# Patient Record
Sex: MALE | Race: WHITE | NOT HISPANIC OR LATINO | Employment: OTHER | URBAN - NONMETROPOLITAN AREA
[De-identification: names, ages, dates, MRNs, and addresses within clinical notes are randomized per-mention and may not be internally consistent; named-entity substitution may affect disease eponyms.]

---

## 2021-06-01 ENCOUNTER — OFFICE VISIT (OUTPATIENT)
Dept: FAMILY MEDICINE | Facility: CLINIC | Age: 86
End: 2021-06-01
Payer: MEDICARE

## 2021-06-01 VITALS
DIASTOLIC BLOOD PRESSURE: 79 MMHG | HEART RATE: 72 BPM | OXYGEN SATURATION: 98 % | RESPIRATION RATE: 18 BRPM | SYSTOLIC BLOOD PRESSURE: 165 MMHG | BODY MASS INDEX: 21.16 KG/M2 | WEIGHT: 127 LBS | HEIGHT: 65 IN | TEMPERATURE: 98 F

## 2021-06-01 DIAGNOSIS — I10 ESSENTIAL HYPERTENSION: Primary | ICD-10-CM

## 2021-06-01 DIAGNOSIS — J44.9 CHRONIC OBSTRUCTIVE PULMONARY DISEASE, UNSPECIFIED COPD TYPE: ICD-10-CM

## 2021-06-01 DIAGNOSIS — E03.9 HYPOTHYROIDISM, UNSPECIFIED TYPE: ICD-10-CM

## 2021-06-01 DIAGNOSIS — Z86.73 H/O: CVA (CEREBROVASCULAR ACCIDENT): ICD-10-CM

## 2021-06-01 DIAGNOSIS — E78.5 DYSLIPIDEMIA: ICD-10-CM

## 2021-06-01 LAB
ANION GAP SERPL CALCULATED.3IONS-SCNC: 13 MMOL/L (ref 7–16)
BASOPHILS # BLD AUTO: 0.07 K/UL (ref 0–0.2)
BASOPHILS NFR BLD AUTO: 1.1 % (ref 0–1)
BUN SERPL-MCNC: 17 MG/DL (ref 7–18)
BUN/CREAT SERPL: 17 (ref 6–20)
CALCIUM SERPL-MCNC: 9.4 MG/DL (ref 8.5–10.1)
CHLORIDE SERPL-SCNC: 105 MMOL/L (ref 98–107)
CO2 SERPL-SCNC: 27 MMOL/L (ref 21–32)
CREAT SERPL-MCNC: 0.98 MG/DL (ref 0.7–1.3)
DIFFERENTIAL METHOD BLD: ABNORMAL
EOSINOPHIL # BLD AUTO: 0.26 K/UL (ref 0–0.5)
EOSINOPHIL NFR BLD AUTO: 4 % (ref 1–4)
ERYTHROCYTE [DISTWIDTH] IN BLOOD BY AUTOMATED COUNT: 13.9 % (ref 11.5–14.5)
GLUCOSE SERPL-MCNC: 93 MG/DL (ref 74–106)
HCT VFR BLD AUTO: 41.4 % (ref 40–54)
HGB BLD-MCNC: 13.2 G/DL (ref 13.5–18)
IMM GRANULOCYTES # BLD AUTO: 0.01 K/UL (ref 0–0.04)
IMM GRANULOCYTES NFR BLD: 0.2 % (ref 0–0.4)
LYMPHOCYTES # BLD AUTO: 2.89 K/UL (ref 1–4.8)
LYMPHOCYTES NFR BLD AUTO: 45 % (ref 27–41)
MCH RBC QN AUTO: 27.3 PG (ref 27–31)
MCHC RBC AUTO-ENTMCNC: 31.9 G/DL (ref 32–36)
MCV RBC AUTO: 85.5 FL (ref 80–96)
MONOCYTES # BLD AUTO: 0.8 K/UL (ref 0–0.8)
MONOCYTES NFR BLD AUTO: 12.5 % (ref 2–6)
MPC BLD CALC-MCNC: 10 FL (ref 9.4–12.4)
NEUTROPHILS # BLD AUTO: 2.39 K/UL (ref 1.8–7.7)
NEUTROPHILS NFR BLD AUTO: 37.2 % (ref 53–65)
NRBC # BLD AUTO: 0 X10E3/UL
NRBC, AUTO (.00): 0 %
PLATELET # BLD AUTO: 304 K/UL (ref 150–400)
POTASSIUM SERPL-SCNC: 4.7 MMOL/L (ref 3.5–5.1)
RBC # BLD AUTO: 4.84 M/UL (ref 4.6–6.2)
SODIUM SERPL-SCNC: 140 MMOL/L (ref 136–145)
TSH SERPL DL<=0.005 MIU/L-ACNC: 0.42 UIU/ML (ref 0.36–3.74)
WBC # BLD AUTO: 6.42 K/UL (ref 4.5–11)

## 2021-06-01 PROCEDURE — 99213 PR OFFICE/OUTPT VISIT, EST, LEVL III, 20-29 MIN: ICD-10-PCS | Mod: ,,, | Performed by: NURSE PRACTITIONER

## 2021-06-01 PROCEDURE — 84443 ASSAY THYROID STIM HORMONE: CPT | Mod: ,,, | Performed by: CLINICAL MEDICAL LABORATORY

## 2021-06-01 PROCEDURE — 85025 CBC WITH DIFFERENTIAL: ICD-10-PCS | Mod: ,,, | Performed by: CLINICAL MEDICAL LABORATORY

## 2021-06-01 PROCEDURE — 99213 OFFICE O/P EST LOW 20 MIN: CPT | Mod: ,,, | Performed by: NURSE PRACTITIONER

## 2021-06-01 PROCEDURE — 84443 TSH: ICD-10-PCS | Mod: ,,, | Performed by: CLINICAL MEDICAL LABORATORY

## 2021-06-01 PROCEDURE — 80048 BASIC METABOLIC PNL TOTAL CA: CPT | Mod: ,,, | Performed by: CLINICAL MEDICAL LABORATORY

## 2021-06-01 PROCEDURE — 85025 COMPLETE CBC W/AUTO DIFF WBC: CPT | Mod: ,,, | Performed by: CLINICAL MEDICAL LABORATORY

## 2021-06-01 PROCEDURE — 80048 BASIC METABOLIC PANEL: ICD-10-PCS | Mod: ,,, | Performed by: CLINICAL MEDICAL LABORATORY

## 2021-06-01 RX ORDER — CLOPIDOGREL BISULFATE 75 MG/1
75 TABLET ORAL DAILY
COMMUNITY
End: 2021-06-01 | Stop reason: SDUPTHER

## 2021-06-01 RX ORDER — GABAPENTIN 100 MG/1
100 CAPSULE ORAL NIGHTLY
COMMUNITY
End: 2021-06-01 | Stop reason: SDUPTHER

## 2021-06-01 RX ORDER — ATORVASTATIN CALCIUM 20 MG/1
20 TABLET, FILM COATED ORAL DAILY
Qty: 90 TABLET | Refills: 1 | Status: SHIPPED | OUTPATIENT
Start: 2021-06-01 | End: 2021-06-08

## 2021-06-01 RX ORDER — LISINOPRIL 10 MG/1
10 TABLET ORAL DAILY
Qty: 30 TABLET | Refills: 2 | Status: SHIPPED | OUTPATIENT
Start: 2021-06-01 | End: 2021-08-19

## 2021-06-01 RX ORDER — CLOPIDOGREL BISULFATE 75 MG/1
75 TABLET ORAL DAILY
Qty: 90 TABLET | Refills: 1 | Status: SHIPPED | OUTPATIENT
Start: 2021-06-01 | End: 2021-11-29

## 2021-06-01 RX ORDER — LEVOTHYROXINE SODIUM 88 UG/1
88 TABLET ORAL
COMMUNITY
End: 2021-06-01 | Stop reason: SDUPTHER

## 2021-06-01 RX ORDER — ASPIRIN 81 MG/1
81 TABLET ORAL DAILY
COMMUNITY
End: 2023-04-13

## 2021-06-01 RX ORDER — ROPINIROLE 0.25 MG/1
0.25 TABLET, FILM COATED ORAL NIGHTLY
Qty: 90 TABLET | Refills: 1 | Status: SHIPPED | OUTPATIENT
Start: 2021-06-01 | End: 2021-11-23 | Stop reason: SDUPTHER

## 2021-06-01 RX ORDER — ROPINIROLE 0.25 MG/1
0.25 TABLET, FILM COATED ORAL NIGHTLY
COMMUNITY
End: 2021-06-01 | Stop reason: SDUPTHER

## 2021-06-01 RX ORDER — ATORVASTATIN CALCIUM 20 MG/1
20 TABLET, FILM COATED ORAL DAILY
COMMUNITY
End: 2021-06-01 | Stop reason: SDUPTHER

## 2021-06-01 RX ORDER — LEVOTHYROXINE SODIUM 88 UG/1
88 TABLET ORAL
Qty: 90 TABLET | Refills: 1 | Status: SHIPPED | OUTPATIENT
Start: 2021-06-01 | End: 2021-12-20

## 2021-06-01 RX ORDER — AMLODIPINE BESYLATE 5 MG/1
5 TABLET ORAL DAILY
COMMUNITY
End: 2021-06-01 | Stop reason: SDUPTHER

## 2021-06-01 RX ORDER — GABAPENTIN 100 MG/1
100 CAPSULE ORAL NIGHTLY
Qty: 90 CAPSULE | Refills: 1 | Status: SHIPPED | OUTPATIENT
Start: 2021-06-01 | End: 2022-11-10 | Stop reason: SDUPTHER

## 2021-06-01 RX ORDER — AMLODIPINE BESYLATE 5 MG/1
5 TABLET ORAL DAILY
Qty: 90 TABLET | Refills: 1 | Status: SHIPPED | OUTPATIENT
Start: 2021-06-01 | End: 2021-06-08

## 2021-06-02 PROBLEM — E78.5 DYSLIPIDEMIA: Status: ACTIVE | Noted: 2021-06-02

## 2021-06-02 PROBLEM — E03.9 HYPOTHYROIDISM: Status: ACTIVE | Noted: 2021-06-02

## 2021-06-02 PROBLEM — Z86.73 H/O: CVA (CEREBROVASCULAR ACCIDENT): Status: ACTIVE | Noted: 2021-06-02

## 2021-06-02 PROBLEM — I10 ESSENTIAL HYPERTENSION: Status: ACTIVE | Noted: 2021-06-02

## 2021-06-02 PROBLEM — J44.9 COPD (CHRONIC OBSTRUCTIVE PULMONARY DISEASE): Status: ACTIVE | Noted: 2021-06-02

## 2021-06-03 ENCOUNTER — TELEPHONE (OUTPATIENT)
Dept: FAMILY MEDICINE | Facility: CLINIC | Age: 86
End: 2021-06-03

## 2021-06-08 ENCOUNTER — OFFICE VISIT (OUTPATIENT)
Dept: FAMILY MEDICINE | Facility: CLINIC | Age: 86
End: 2021-06-08
Payer: MEDICARE

## 2021-06-08 VITALS
HEIGHT: 65 IN | DIASTOLIC BLOOD PRESSURE: 80 MMHG | TEMPERATURE: 98 F | BODY MASS INDEX: 21.16 KG/M2 | OXYGEN SATURATION: 98 % | SYSTOLIC BLOOD PRESSURE: 124 MMHG | RESPIRATION RATE: 18 BRPM | WEIGHT: 127 LBS | HEART RATE: 81 BPM

## 2021-06-08 DIAGNOSIS — I10 ESSENTIAL HYPERTENSION: ICD-10-CM

## 2021-06-08 DIAGNOSIS — Z12.5 ENCOUNTER FOR SCREENING FOR MALIGNANT NEOPLASM OF PROSTATE: ICD-10-CM

## 2021-06-08 DIAGNOSIS — E78.5 DYSLIPIDEMIA: Primary | ICD-10-CM

## 2021-06-08 DIAGNOSIS — Z85.46 H/O PROSTATE CANCER: ICD-10-CM

## 2021-06-08 LAB
CHOLEST SERPL-MCNC: 120 MG/DL (ref 0–200)
CHOLEST/HDLC SERPL: 2.1 {RATIO}
HDLC SERPL-MCNC: 56 MG/DL (ref 40–60)
LDLC SERPL CALC-MCNC: 42 MG/DL
LDLC/HDLC SERPL: 0.8 {RATIO}
NONHDLC SERPL-MCNC: 64 MG/DL
PSA SERPL-MCNC: 0.61 NG/ML (ref 0–4.4)
TRIGL SERPL-MCNC: 109 MG/DL (ref 35–150)
VLDLC SERPL-MCNC: 22 MG/DL

## 2021-06-08 PROCEDURE — 80061 LIPID PANEL: ICD-10-PCS | Mod: ,,, | Performed by: CLINICAL MEDICAL LABORATORY

## 2021-06-08 PROCEDURE — 80061 LIPID PANEL: CPT | Mod: ,,, | Performed by: CLINICAL MEDICAL LABORATORY

## 2021-06-08 PROCEDURE — 99213 PR OFFICE/OUTPT VISIT, EST, LEVL III, 20-29 MIN: ICD-10-PCS | Mod: ,,, | Performed by: NURSE PRACTITIONER

## 2021-06-08 PROCEDURE — G0103 PSA, SCREENING: ICD-10-PCS | Mod: ,,, | Performed by: CLINICAL MEDICAL LABORATORY

## 2021-06-08 PROCEDURE — 99213 OFFICE O/P EST LOW 20 MIN: CPT | Mod: ,,, | Performed by: NURSE PRACTITIONER

## 2021-06-08 PROCEDURE — G0103 PSA SCREENING: HCPCS | Mod: ,,, | Performed by: CLINICAL MEDICAL LABORATORY

## 2021-06-08 RX ORDER — ATORVASTATIN CALCIUM 20 MG/1
TABLET, FILM COATED ORAL
Qty: 90 TABLET | Refills: 1
Start: 2021-06-08 | End: 2021-12-13

## 2021-06-09 ENCOUNTER — TELEPHONE (OUTPATIENT)
Dept: FAMILY MEDICINE | Facility: CLINIC | Age: 86
End: 2021-06-09

## 2021-08-31 RX ORDER — LISINOPRIL 10 MG/1
10 TABLET ORAL DAILY
Qty: 30 TABLET | Refills: 2 | Status: SHIPPED | OUTPATIENT
Start: 2021-08-31 | End: 2021-11-23 | Stop reason: SDUPTHER

## 2021-11-23 RX ORDER — LISINOPRIL 10 MG/1
10 TABLET ORAL DAILY
Qty: 30 TABLET | Refills: 0 | Status: SHIPPED | OUTPATIENT
Start: 2021-11-23 | End: 2021-12-23 | Stop reason: SDUPTHER

## 2021-11-23 RX ORDER — ROPINIROLE 0.25 MG/1
0.25 TABLET, FILM COATED ORAL NIGHTLY
Qty: 30 TABLET | Refills: 0 | Status: SHIPPED | OUTPATIENT
Start: 2021-11-23 | End: 2021-12-22 | Stop reason: SDUPTHER

## 2022-03-11 DIAGNOSIS — Z71.89 COMPLEX CARE COORDINATION: ICD-10-CM

## 2022-11-10 ENCOUNTER — OFFICE VISIT (OUTPATIENT)
Dept: FAMILY MEDICINE | Facility: CLINIC | Age: 87
End: 2022-11-10
Payer: MEDICARE

## 2022-11-10 VITALS
WEIGHT: 118 LBS | RESPIRATION RATE: 18 BRPM | BODY MASS INDEX: 19.66 KG/M2 | DIASTOLIC BLOOD PRESSURE: 106 MMHG | TEMPERATURE: 98 F | OXYGEN SATURATION: 99 % | SYSTOLIC BLOOD PRESSURE: 174 MMHG | HEIGHT: 65 IN | HEART RATE: 72 BPM

## 2022-11-10 DIAGNOSIS — R41.3 MEMORY LOSS: ICD-10-CM

## 2022-11-10 DIAGNOSIS — I10 ESSENTIAL HYPERTENSION: Primary | ICD-10-CM

## 2022-11-10 DIAGNOSIS — Z91.81 AT HIGH RISK FOR FALLS: ICD-10-CM

## 2022-11-10 DIAGNOSIS — E78.5 DYSLIPIDEMIA: ICD-10-CM

## 2022-11-10 DIAGNOSIS — Z12.5 SCREENING FOR PROSTATE CANCER: ICD-10-CM

## 2022-11-10 DIAGNOSIS — R26.81 UNSTEADY GAIT: ICD-10-CM

## 2022-11-10 DIAGNOSIS — E03.9 HYPOTHYROIDISM, UNSPECIFIED TYPE: ICD-10-CM

## 2022-11-10 LAB
ALBUMIN SERPL BCP-MCNC: 4.1 G/DL (ref 3.5–5)
ALBUMIN/GLOB SERPL: 1.1 {RATIO}
ALP SERPL-CCNC: 113 U/L (ref 45–115)
ALT SERPL W P-5'-P-CCNC: 29 U/L (ref 16–61)
ANION GAP SERPL CALCULATED.3IONS-SCNC: 13 MMOL/L (ref 7–16)
AST SERPL W P-5'-P-CCNC: 40 U/L (ref 15–37)
BASOPHILS # BLD AUTO: 0.07 K/UL (ref 0–0.2)
BASOPHILS NFR BLD AUTO: 1 % (ref 0–1)
BILIRUB SERPL-MCNC: 1 MG/DL (ref ?–1.2)
BUN SERPL-MCNC: 17 MG/DL (ref 7–18)
BUN/CREAT SERPL: 16 (ref 6–20)
CALCIUM SERPL-MCNC: 9.4 MG/DL (ref 8.5–10.1)
CHLORIDE SERPL-SCNC: 106 MMOL/L (ref 98–107)
CHOLEST SERPL-MCNC: 112 MG/DL (ref 0–200)
CHOLEST/HDLC SERPL: 2.1 {RATIO}
CO2 SERPL-SCNC: 24 MMOL/L (ref 21–32)
CREAT SERPL-MCNC: 1.04 MG/DL (ref 0.7–1.3)
DIFFERENTIAL METHOD BLD: ABNORMAL
EGFR (NO RACE VARIABLE) (RUSH/TITUS): 69 ML/MIN/1.73M²
EOSINOPHIL # BLD AUTO: 0.07 K/UL (ref 0–0.5)
EOSINOPHIL NFR BLD AUTO: 1 % (ref 1–4)
ERYTHROCYTE [DISTWIDTH] IN BLOOD BY AUTOMATED COUNT: 14.6 % (ref 11.5–14.5)
GLOBULIN SER-MCNC: 3.6 G/DL (ref 2–4)
GLUCOSE SERPL-MCNC: 89 MG/DL (ref 74–106)
HCT VFR BLD AUTO: 41.2 % (ref 40–54)
HDLC SERPL-MCNC: 54 MG/DL (ref 40–60)
HGB BLD-MCNC: 12.7 G/DL (ref 13.5–18)
IMM GRANULOCYTES # BLD AUTO: 0.02 K/UL (ref 0–0.04)
IMM GRANULOCYTES NFR BLD: 0.3 % (ref 0–0.4)
LDLC SERPL CALC-MCNC: 44 MG/DL
LYMPHOCYTES # BLD AUTO: 2.87 K/UL (ref 1–4.8)
LYMPHOCYTES NFR BLD AUTO: 41.3 % (ref 27–41)
MCH RBC QN AUTO: 27.7 PG (ref 27–31)
MCHC RBC AUTO-ENTMCNC: 30.8 G/DL (ref 32–36)
MCV RBC AUTO: 90 FL (ref 80–96)
MONOCYTES # BLD AUTO: 0.63 K/UL (ref 0–0.8)
MONOCYTES NFR BLD AUTO: 9.1 % (ref 2–6)
MPC BLD CALC-MCNC: 11 FL (ref 9.4–12.4)
NEUTROPHILS # BLD AUTO: 3.29 K/UL (ref 1.8–7.7)
NEUTROPHILS NFR BLD AUTO: 47.3 % (ref 53–65)
NONHDLC SERPL-MCNC: 58 MG/DL
NRBC # BLD AUTO: 0 X10E3/UL
NRBC, AUTO (.00): 0 %
PLATELET # BLD AUTO: 259 K/UL (ref 150–400)
POTASSIUM SERPL-SCNC: 3.8 MMOL/L (ref 3.5–5.1)
PROT SERPL-MCNC: 7.7 G/DL (ref 6.4–8.2)
PSA SERPL-MCNC: 1.62 NG/ML
RBC # BLD AUTO: 4.58 M/UL (ref 4.6–6.2)
SODIUM SERPL-SCNC: 139 MMOL/L (ref 136–145)
TRIGL SERPL-MCNC: 71 MG/DL (ref 35–150)
TSH SERPL DL<=0.005 MIU/L-ACNC: 0.05 UIU/ML (ref 0.36–3.74)
VLDLC SERPL-MCNC: 14 MG/DL
WBC # BLD AUTO: 6.95 K/UL (ref 4.5–11)

## 2022-11-10 PROCEDURE — 1126F PR PAIN SEVERITY QUANTIFIED, NO PAIN PRESENT: ICD-10-PCS | Mod: ,,, | Performed by: NURSE PRACTITIONER

## 2022-11-10 PROCEDURE — 1160F RVW MEDS BY RX/DR IN RCRD: CPT | Mod: ,,, | Performed by: NURSE PRACTITIONER

## 2022-11-10 PROCEDURE — 1159F PR MEDICATION LIST DOCUMENTED IN MEDICAL RECORD: ICD-10-PCS | Mod: ,,, | Performed by: NURSE PRACTITIONER

## 2022-11-10 PROCEDURE — 80061 LIPID PANEL: CPT | Mod: ,,, | Performed by: CLINICAL MEDICAL LABORATORY

## 2022-11-10 PROCEDURE — G0103 PSA SCREENING: HCPCS | Mod: ,,, | Performed by: CLINICAL MEDICAL LABORATORY

## 2022-11-10 PROCEDURE — 99214 OFFICE O/P EST MOD 30 MIN: CPT | Mod: ,,, | Performed by: NURSE PRACTITIONER

## 2022-11-10 PROCEDURE — 99214 PR OFFICE/OUTPT VISIT, EST, LEVL IV, 30-39 MIN: ICD-10-PCS | Mod: ,,, | Performed by: NURSE PRACTITIONER

## 2022-11-10 PROCEDURE — 1159F MED LIST DOCD IN RCRD: CPT | Mod: ,,, | Performed by: NURSE PRACTITIONER

## 2022-11-10 PROCEDURE — 1160F PR REVIEW ALL MEDS BY PRESCRIBER/CLIN PHARMACIST DOCUMENTED: ICD-10-PCS | Mod: ,,, | Performed by: NURSE PRACTITIONER

## 2022-11-10 PROCEDURE — 80050 PR  GENERAL HEALTH PANEL: ICD-10-PCS | Mod: ,,, | Performed by: CLINICAL MEDICAL LABORATORY

## 2022-11-10 PROCEDURE — 3288F FALL RISK ASSESSMENT DOCD: CPT | Mod: ,,, | Performed by: NURSE PRACTITIONER

## 2022-11-10 PROCEDURE — G0103 PSA, SCREENING: ICD-10-PCS | Mod: ,,, | Performed by: CLINICAL MEDICAL LABORATORY

## 2022-11-10 PROCEDURE — 1101F PT FALLS ASSESS-DOCD LE1/YR: CPT | Mod: ,,, | Performed by: NURSE PRACTITIONER

## 2022-11-10 PROCEDURE — 3288F PR FALLS RISK ASSESSMENT DOCUMENTED: ICD-10-PCS | Mod: ,,, | Performed by: NURSE PRACTITIONER

## 2022-11-10 PROCEDURE — 1126F AMNT PAIN NOTED NONE PRSNT: CPT | Mod: ,,, | Performed by: NURSE PRACTITIONER

## 2022-11-10 PROCEDURE — 1101F PR PT FALLS ASSESS DOC 0-1 FALLS W/OUT INJ PAST YR: ICD-10-PCS | Mod: ,,, | Performed by: NURSE PRACTITIONER

## 2022-11-10 PROCEDURE — 80061 LIPID PANEL: ICD-10-PCS | Mod: ,,, | Performed by: CLINICAL MEDICAL LABORATORY

## 2022-11-10 PROCEDURE — 80050 GENERAL HEALTH PANEL: CPT | Mod: ,,, | Performed by: CLINICAL MEDICAL LABORATORY

## 2022-11-10 RX ORDER — LEVOTHYROXINE SODIUM 88 UG/1
88 TABLET ORAL
Qty: 90 TABLET | Refills: 1 | Status: SHIPPED | OUTPATIENT
Start: 2022-11-10 | End: 2022-11-10 | Stop reason: SDUPTHER

## 2022-11-10 RX ORDER — GABAPENTIN 100 MG/1
100 CAPSULE ORAL NIGHTLY
Qty: 90 CAPSULE | Refills: 1 | Status: SHIPPED | OUTPATIENT
Start: 2022-11-10 | End: 2022-11-11

## 2022-11-10 RX ORDER — ROPINIROLE 0.25 MG/1
0.25 TABLET, FILM COATED ORAL NIGHTLY
Qty: 90 TABLET | Refills: 1 | Status: SHIPPED | OUTPATIENT
Start: 2022-11-10 | End: 2022-11-11

## 2022-11-10 RX ORDER — ROPINIROLE 0.25 MG/1
0.25 TABLET, FILM COATED ORAL NIGHTLY
Qty: 90 TABLET | Refills: 1 | Status: SHIPPED | OUTPATIENT
Start: 2022-11-10 | End: 2022-11-10 | Stop reason: SDUPTHER

## 2022-11-10 RX ORDER — LISINOPRIL 20 MG/1
20 TABLET ORAL DAILY
Qty: 90 TABLET | Refills: 1 | Status: SHIPPED | OUTPATIENT
Start: 2022-11-10 | End: 2022-11-11 | Stop reason: SDUPTHER

## 2022-11-10 RX ORDER — LEVOTHYROXINE SODIUM 88 UG/1
88 TABLET ORAL
Qty: 90 TABLET | Refills: 1 | Status: SHIPPED | OUTPATIENT
Start: 2022-11-10 | End: 2022-11-11

## 2022-11-10 RX ORDER — CLOPIDOGREL BISULFATE 75 MG/1
75 TABLET ORAL DAILY
Qty: 90 TABLET | Refills: 1 | Status: SHIPPED | OUTPATIENT
Start: 2022-11-10 | End: 2022-11-11 | Stop reason: SDUPTHER

## 2022-11-10 RX ORDER — CLOPIDOGREL BISULFATE 75 MG/1
75 TABLET ORAL DAILY
Qty: 90 TABLET | Refills: 1 | Status: SHIPPED | OUTPATIENT
Start: 2022-11-10 | End: 2022-11-10 | Stop reason: SDUPTHER

## 2022-11-10 RX ORDER — LISINOPRIL 10 MG/1
10 TABLET ORAL DAILY
Qty: 90 TABLET | Refills: 1 | Status: CANCELLED | OUTPATIENT
Start: 2022-11-10

## 2022-11-10 RX ORDER — ATORVASTATIN CALCIUM 20 MG/1
20 TABLET, FILM COATED ORAL NIGHTLY
Qty: 90 TABLET | Refills: 1 | Status: SHIPPED | OUTPATIENT
Start: 2022-11-10 | End: 2022-11-11 | Stop reason: SDUPTHER

## 2022-11-10 RX ORDER — LISINOPRIL 20 MG/1
20 TABLET ORAL DAILY
Qty: 90 TABLET | Refills: 1 | Status: SHIPPED | OUTPATIENT
Start: 2022-11-10 | End: 2022-11-10 | Stop reason: SDUPTHER

## 2022-11-10 RX ORDER — ATORVASTATIN CALCIUM 20 MG/1
20 TABLET, FILM COATED ORAL NIGHTLY
Qty: 90 TABLET | Refills: 1 | Status: SHIPPED | OUTPATIENT
Start: 2022-11-10 | End: 2022-11-10 | Stop reason: SDUPTHER

## 2022-11-10 NOTE — PROGRESS NOTES
Subjective:       Patient ID: Gregory Plasencia is a 88 y.o. male.    Chief Complaint: Medication Refill (Pt is fasting)    Mr. Plasencia presents to clinic in follow up, he is alone and did not bring any medication bottles with him. He has a history of CVA, HTN, HLD, hypothyroidism. States he uses a mail order pharmacy, does not know the name of it.    Review of Systems   Constitutional: Negative.    Respiratory: Negative.     Cardiovascular: Negative.    Gastrointestinal: Negative.    Neurological:  Positive for weakness (RLE).   Psychiatric/Behavioral:  Negative for dysphoric mood. The patient is not nervous/anxious.        Objective:      Physical Exam  Vitals reviewed.   Constitutional:       Appearance: Normal appearance.   HENT:      Head: Normocephalic.   Cardiovascular:      Rate and Rhythm: Normal rate and regular rhythm.   Pulmonary:      Effort: Pulmonary effort is normal.      Breath sounds: Normal breath sounds.   Abdominal:      Palpations: Abdomen is soft.   Musculoskeletal:         General: Normal range of motion.      Cervical back: Normal range of motion.      Comments: Ambulates with assistance of a cane   Skin:     General: Skin is warm and dry.   Neurological:      Mental Status: He is alert.      Comments: Oriented to self and place, still drives    MMSE score 0   Psychiatric:         Behavior: Behavior normal.       Assessment:       Problem List Items Addressed This Visit          Cardiac/Vascular    Essential hypertension - Primary    Relevant Orders    Comprehensive Metabolic Panel    CBC Auto Differential    Dyslipidemia    Relevant Orders    Lipid Panel       Endocrine    Hypothyroidism    Relevant Orders    TSH     Other Visit Diagnoses       Screening for prostate cancer        Relevant Orders    PSA, Screening    Memory loss        At high risk for falls        Unsteady gait                  Plan:         Increase Lisinopril to 20mg QD. Schedule 3 month follow up.    Fasting labs today, we  will notify you of results.    Seeing a specialist, need these notes, he does not know the name of provider. Please contact his family to get this information.    MMSE score 0, neurology referral if he is not seeing already.    Home Health Referral- Blood pressure follow up, medication increased today, will need BP log and medication may need further adjustment. I need a current medication list from the patient's home, PT/OT for high fall risk and RLE weakness s/p    CVA,  for community resources. May need follow       Up labs depending on results from today.

## 2022-11-11 DIAGNOSIS — G25.81 RESTLESS LEGS: Primary | ICD-10-CM

## 2022-11-11 DIAGNOSIS — I10 ESSENTIAL HYPERTENSION: ICD-10-CM

## 2022-11-11 DIAGNOSIS — E78.5 DYSLIPIDEMIA: ICD-10-CM

## 2022-11-11 RX ORDER — LISINOPRIL 20 MG/1
20 TABLET ORAL DAILY
Qty: 90 TABLET | Refills: 1 | Status: CANCELLED | OUTPATIENT
Start: 2022-11-11 | End: 2023-11-11

## 2022-11-11 RX ORDER — ROPINIROLE 0.5 MG/1
0.5 TABLET, FILM COATED ORAL NIGHTLY
Qty: 90 TABLET | Refills: 1 | Status: CANCELLED | OUTPATIENT
Start: 2022-11-11

## 2022-11-11 RX ORDER — LEVOTHYROXINE SODIUM 75 UG/1
75 TABLET ORAL
COMMUNITY
End: 2022-11-11 | Stop reason: SDUPTHER

## 2022-11-11 RX ORDER — ROPINIROLE 0.5 MG/1
0.5 TABLET, FILM COATED ORAL NIGHTLY
COMMUNITY
End: 2022-11-11 | Stop reason: SDUPTHER

## 2022-11-11 RX ORDER — CLOPIDOGREL BISULFATE 75 MG/1
75 TABLET ORAL DAILY
Qty: 90 TABLET | Refills: 1 | Status: CANCELLED | OUTPATIENT
Start: 2022-11-11

## 2022-11-11 RX ORDER — LEVOTHYROXINE SODIUM 75 UG/1
75 TABLET ORAL
Qty: 90 TABLET | Refills: 1 | Status: CANCELLED | OUTPATIENT
Start: 2022-11-11

## 2022-11-11 RX ORDER — ATORVASTATIN CALCIUM 20 MG/1
20 TABLET, FILM COATED ORAL NIGHTLY
Qty: 90 TABLET | Refills: 1 | Status: CANCELLED | OUTPATIENT
Start: 2022-11-11

## 2022-11-14 RX ORDER — ROPINIROLE 0.5 MG/1
TABLET, FILM COATED ORAL
Qty: 90 TABLET | Refills: 1 | Status: SHIPPED | OUTPATIENT
Start: 2022-11-14 | End: 2023-02-14 | Stop reason: SDUPTHER

## 2022-11-14 RX ORDER — LEVOTHYROXINE SODIUM 75 UG/1
75 TABLET ORAL
Qty: 90 TABLET | Refills: 1 | Status: SHIPPED | OUTPATIENT
Start: 2022-11-14 | End: 2023-02-08

## 2022-11-14 RX ORDER — CLOPIDOGREL BISULFATE 75 MG/1
75 TABLET ORAL DAILY
Qty: 90 TABLET | Refills: 1 | Status: SHIPPED | OUTPATIENT
Start: 2022-11-14 | End: 2023-07-14 | Stop reason: SDUPTHER

## 2022-11-14 RX ORDER — ATORVASTATIN CALCIUM 20 MG/1
20 TABLET, FILM COATED ORAL NIGHTLY
Qty: 90 TABLET | Refills: 1 | Status: SHIPPED | OUTPATIENT
Start: 2022-11-14 | End: 2023-02-14 | Stop reason: SDUPTHER

## 2022-11-14 RX ORDER — LISINOPRIL 20 MG/1
20 TABLET ORAL DAILY
Qty: 90 TABLET | Refills: 1 | Status: SHIPPED | OUTPATIENT
Start: 2022-11-14 | End: 2023-02-14 | Stop reason: SDUPTHER

## 2022-11-16 ENCOUNTER — TELEPHONE (OUTPATIENT)
Dept: FAMILY MEDICINE | Facility: CLINIC | Age: 87
End: 2022-11-16
Payer: MEDICARE

## 2022-11-16 NOTE — TELEPHONE ENCOUNTER
Notified pt's wife of results, wife voiced understanding      ----- Message from ANDREI Tejeda sent at 11/15/2022  5:10 PM CST -----  Labs ok, TSH  is low, reduce levothyroxine and recheck in one month.

## 2022-12-09 DIAGNOSIS — Z71.89 COMPLEX CARE COORDINATION: ICD-10-CM

## 2023-02-08 ENCOUNTER — OFFICE VISIT (OUTPATIENT)
Dept: FAMILY MEDICINE | Facility: CLINIC | Age: 88
End: 2023-02-08
Payer: MEDICARE

## 2023-02-08 VITALS
HEART RATE: 84 BPM | RESPIRATION RATE: 20 BRPM | DIASTOLIC BLOOD PRESSURE: 104 MMHG | BODY MASS INDEX: 19.66 KG/M2 | WEIGHT: 118 LBS | OXYGEN SATURATION: 98 % | SYSTOLIC BLOOD PRESSURE: 176 MMHG | HEIGHT: 65 IN

## 2023-02-08 DIAGNOSIS — I10 ESSENTIAL HYPERTENSION: Primary | ICD-10-CM

## 2023-02-08 DIAGNOSIS — J44.9 CHRONIC OBSTRUCTIVE PULMONARY DISEASE, UNSPECIFIED COPD TYPE: ICD-10-CM

## 2023-02-08 DIAGNOSIS — E03.9 HYPOTHYROIDISM, UNSPECIFIED TYPE: ICD-10-CM

## 2023-02-08 DIAGNOSIS — E78.5 DYSLIPIDEMIA: ICD-10-CM

## 2023-02-08 DIAGNOSIS — Z86.73 H/O: CVA (CEREBROVASCULAR ACCIDENT): ICD-10-CM

## 2023-02-08 DIAGNOSIS — R41.3 MEMORY LOSS: ICD-10-CM

## 2023-02-08 LAB
ALBUMIN SERPL BCP-MCNC: 3.3 G/DL (ref 3.5–5)
ALBUMIN/GLOB SERPL: 0.9 {RATIO}
ALP SERPL-CCNC: 130 U/L (ref 45–115)
ALT SERPL W P-5'-P-CCNC: 23 U/L (ref 16–61)
ANION GAP SERPL CALCULATED.3IONS-SCNC: 11 MMOL/L (ref 7–16)
AST SERPL W P-5'-P-CCNC: 30 U/L (ref 15–37)
BASOPHILS # BLD AUTO: 0.08 K/UL (ref 0–0.2)
BASOPHILS NFR BLD AUTO: 1.1 % (ref 0–1)
BILIRUB SERPL-MCNC: 0.7 MG/DL (ref ?–1.2)
BUN SERPL-MCNC: 9 MG/DL (ref 7–18)
BUN/CREAT SERPL: 8 (ref 6–20)
CALCIUM SERPL-MCNC: 9 MG/DL (ref 8.5–10.1)
CHLORIDE SERPL-SCNC: 108 MMOL/L (ref 98–107)
CHOLEST SERPL-MCNC: 106 MG/DL (ref 0–200)
CHOLEST/HDLC SERPL: 2.3 {RATIO}
CO2 SERPL-SCNC: 28 MMOL/L (ref 21–32)
CREAT SERPL-MCNC: 1.12 MG/DL (ref 0.7–1.3)
DIFFERENTIAL METHOD BLD: ABNORMAL
EGFR (NO RACE VARIABLE) (RUSH/TITUS): 63 ML/MIN/1.73M²
EOSINOPHIL # BLD AUTO: 0.26 K/UL (ref 0–0.5)
EOSINOPHIL NFR BLD AUTO: 3.6 % (ref 1–4)
ERYTHROCYTE [DISTWIDTH] IN BLOOD BY AUTOMATED COUNT: 14.6 % (ref 11.5–14.5)
FOLATE SERPL-MCNC: 18.7 NG/ML (ref 3.1–17.5)
GLOBULIN SER-MCNC: 3.5 G/DL (ref 2–4)
GLUCOSE SERPL-MCNC: 96 MG/DL (ref 74–106)
HCT VFR BLD AUTO: 37.9 % (ref 40–54)
HDLC SERPL-MCNC: 47 MG/DL (ref 40–60)
HGB BLD-MCNC: 12 G/DL (ref 13.5–18)
IMM GRANULOCYTES # BLD AUTO: 0.02 K/UL (ref 0–0.04)
IMM GRANULOCYTES NFR BLD: 0.3 % (ref 0–0.4)
LDLC SERPL CALC-MCNC: 41 MG/DL
LYMPHOCYTES # BLD AUTO: 3.16 K/UL (ref 1–4.8)
LYMPHOCYTES NFR BLD AUTO: 44 % (ref 27–41)
MCH RBC QN AUTO: 27.9 PG (ref 27–31)
MCHC RBC AUTO-ENTMCNC: 31.7 G/DL (ref 32–36)
MCV RBC AUTO: 88.1 FL (ref 80–96)
MONOCYTES # BLD AUTO: 0.88 K/UL (ref 0–0.8)
MONOCYTES NFR BLD AUTO: 12.3 % (ref 2–6)
MPC BLD CALC-MCNC: 10.7 FL (ref 9.4–12.4)
NEUTROPHILS # BLD AUTO: 2.78 K/UL (ref 1.8–7.7)
NEUTROPHILS NFR BLD AUTO: 38.7 % (ref 53–65)
NONHDLC SERPL-MCNC: 59 MG/DL
NRBC # BLD AUTO: 0 X10E3/UL
NRBC, AUTO (.00): 0 %
PLATELET # BLD AUTO: 250 K/UL (ref 150–400)
POTASSIUM SERPL-SCNC: 3.4 MMOL/L (ref 3.5–5.1)
PROT SERPL-MCNC: 6.8 G/DL (ref 6.4–8.2)
RBC # BLD AUTO: 4.3 M/UL (ref 4.6–6.2)
SODIUM SERPL-SCNC: 144 MMOL/L (ref 136–145)
T4 FREE SERPL-MCNC: 1.79 NG/DL (ref 0.76–1.46)
TRIGL SERPL-MCNC: 90 MG/DL (ref 35–150)
TSH SERPL DL<=0.005 MIU/L-ACNC: 0.02 UIU/ML (ref 0.36–3.74)
VIT B12 SERPL-MCNC: 373 PG/ML (ref 193–986)
VLDLC SERPL-MCNC: 18 MG/DL
WBC # BLD AUTO: 7.18 K/UL (ref 4.5–11)

## 2023-02-08 PROCEDURE — 82746 ASSAY OF FOLIC ACID SERUM: CPT | Mod: ,,, | Performed by: CLINICAL MEDICAL LABORATORY

## 2023-02-08 PROCEDURE — 80050 PR  GENERAL HEALTH PANEL: ICD-10-PCS | Mod: ,,, | Performed by: CLINICAL MEDICAL LABORATORY

## 2023-02-08 PROCEDURE — 82746 VITAMIN B12/FOLATE, SERUM PANEL: ICD-10-PCS | Mod: ,,, | Performed by: CLINICAL MEDICAL LABORATORY

## 2023-02-08 PROCEDURE — 1126F PR PAIN SEVERITY QUANTIFIED, NO PAIN PRESENT: ICD-10-PCS | Mod: ,,, | Performed by: NURSE PRACTITIONER

## 2023-02-08 PROCEDURE — 3288F FALL RISK ASSESSMENT DOCD: CPT | Mod: ,,, | Performed by: NURSE PRACTITIONER

## 2023-02-08 PROCEDURE — 1101F PR PT FALLS ASSESS DOC 0-1 FALLS W/OUT INJ PAST YR: ICD-10-PCS | Mod: ,,, | Performed by: NURSE PRACTITIONER

## 2023-02-08 PROCEDURE — 84439 T4, FREE: ICD-10-PCS | Mod: ,,, | Performed by: CLINICAL MEDICAL LABORATORY

## 2023-02-08 PROCEDURE — 3288F PR FALLS RISK ASSESSMENT DOCUMENTED: ICD-10-PCS | Mod: ,,, | Performed by: NURSE PRACTITIONER

## 2023-02-08 PROCEDURE — 1160F PR REVIEW ALL MEDS BY PRESCRIBER/CLIN PHARMACIST DOCUMENTED: ICD-10-PCS | Mod: ,,, | Performed by: NURSE PRACTITIONER

## 2023-02-08 PROCEDURE — 80050 GENERAL HEALTH PANEL: CPT | Mod: ,,, | Performed by: CLINICAL MEDICAL LABORATORY

## 2023-02-08 PROCEDURE — 1159F MED LIST DOCD IN RCRD: CPT | Mod: ,,, | Performed by: NURSE PRACTITIONER

## 2023-02-08 PROCEDURE — 1160F RVW MEDS BY RX/DR IN RCRD: CPT | Mod: ,,, | Performed by: NURSE PRACTITIONER

## 2023-02-08 PROCEDURE — 1159F PR MEDICATION LIST DOCUMENTED IN MEDICAL RECORD: ICD-10-PCS | Mod: ,,, | Performed by: NURSE PRACTITIONER

## 2023-02-08 PROCEDURE — 82607 VITAMIN B-12: CPT | Mod: ,,, | Performed by: CLINICAL MEDICAL LABORATORY

## 2023-02-08 PROCEDURE — 82607 VITAMIN B12/FOLATE, SERUM PANEL: ICD-10-PCS | Mod: ,,, | Performed by: CLINICAL MEDICAL LABORATORY

## 2023-02-08 PROCEDURE — 1126F AMNT PAIN NOTED NONE PRSNT: CPT | Mod: ,,, | Performed by: NURSE PRACTITIONER

## 2023-02-08 PROCEDURE — 80061 LIPID PANEL: ICD-10-PCS | Mod: ,,, | Performed by: CLINICAL MEDICAL LABORATORY

## 2023-02-08 PROCEDURE — 99213 OFFICE O/P EST LOW 20 MIN: CPT | Mod: ,,, | Performed by: NURSE PRACTITIONER

## 2023-02-08 PROCEDURE — 99213 PR OFFICE/OUTPT VISIT, EST, LEVL III, 20-29 MIN: ICD-10-PCS | Mod: ,,, | Performed by: NURSE PRACTITIONER

## 2023-02-08 PROCEDURE — 1101F PT FALLS ASSESS-DOCD LE1/YR: CPT | Mod: ,,, | Performed by: NURSE PRACTITIONER

## 2023-02-08 PROCEDURE — 80061 LIPID PANEL: CPT | Mod: ,,, | Performed by: CLINICAL MEDICAL LABORATORY

## 2023-02-08 PROCEDURE — 84439 ASSAY OF FREE THYROXINE: CPT | Mod: ,,, | Performed by: CLINICAL MEDICAL LABORATORY

## 2023-02-08 RX ORDER — LEVOTHYROXINE SODIUM 88 UG/1
88 TABLET ORAL
COMMUNITY
End: 2023-07-14 | Stop reason: SDUPTHER

## 2023-02-08 RX ORDER — MIRTAZAPINE 7.5 MG/1
7.5 TABLET, FILM COATED ORAL NIGHTLY
Qty: 30 TABLET | Refills: 2 | Status: SHIPPED | OUTPATIENT
Start: 2023-02-08 | End: 2023-03-14

## 2023-02-08 RX ORDER — GABAPENTIN 100 MG/1
100 CAPSULE ORAL NIGHTLY
COMMUNITY
End: 2023-02-14 | Stop reason: SDUPTHER

## 2023-02-08 NOTE — PROGRESS NOTES
Subjective:       Patient ID: Gregory Plasencia is a 88 y.o. male.    Chief Complaint: Follow-up (3 month follow up. Pt is not fasting)    Mr. Plasencia presents to clinic accompanied by his wife, she reports that he has had worsening memory impairment. He has a h/o CVA and is followed by neurology, mini cog done at last appointment with a score of 0. Notably the patient states he does not read or write well. Ms. Plasencia reports that the patient is no longer driving, I have encouraged her to come to all medical appointments with the patient.    Follow-up  Pertinent negatives include no chest pain.   Review of Systems   Constitutional: Negative.    Respiratory:  Negative for shortness of breath.    Cardiovascular:  Negative for chest pain, palpitations and leg swelling.   Gastrointestinal: Negative.    Genitourinary: Negative.    Neurological:  Positive for memory loss.       Objective:      Physical Exam  Vitals and nursing note reviewed.   Constitutional:       General: He is not in acute distress.     Appearance: Normal appearance. He is not ill-appearing.   HENT:      Head: Normocephalic.   Cardiovascular:      Rate and Rhythm: Normal rate and regular rhythm.      Heart sounds: Normal heart sounds.   Pulmonary:      Effort: Pulmonary effort is normal.      Breath sounds: Normal breath sounds.   Abdominal:      General: Bowel sounds are normal.      Palpations: Abdomen is soft.   Musculoskeletal:         General: Normal range of motion.   Skin:     General: Skin is warm and dry.   Neurological:      Mental Status: He is alert. Mental status is at baseline.      Sensory: No sensory deficit.      Motor: No weakness.      Coordination: Coordination normal.      Gait: Gait normal.   Psychiatric:         Behavior: Behavior normal.       Assessment:       Problem List Items Addressed This Visit          Neuro    H/O: CVA (cerebrovascular accident)       Pulmonary    COPD (chronic obstructive pulmonary disease)        Cardiac/Vascular    Essential hypertension - Primary    Relevant Orders    Comprehensive Metabolic Panel    CBC Auto Differential    Dyslipidemia    Relevant Orders    Lipid Panel       Endocrine    Hypothyroidism    Relevant Orders    TSH    T4, Free     Other Visit Diagnoses       Memory loss        Relevant Orders    Vitamin B12 & Folate              Plan:     Fasting labs today, pt. To schedule follow up in one week to review lab as he has not been returning to repeat TSH and it is not clear if levothyroxine dose was changed.    Referral for testing for dementia.        Reviewed 2/10/23 by Rommel Berrios MD

## 2023-02-10 ENCOUNTER — TELEPHONE (OUTPATIENT)
Dept: FAMILY MEDICINE | Facility: CLINIC | Age: 88
End: 2023-02-10
Payer: MEDICARE

## 2023-02-10 NOTE — TELEPHONE ENCOUNTER
----- Message from ANDREI Tejeda sent at 2/9/2023 10:59 AM CST -----  Take 1/2 tablet of levothyroxine, keep follow up next week to review labs.

## 2023-02-14 ENCOUNTER — OFFICE VISIT (OUTPATIENT)
Dept: FAMILY MEDICINE | Facility: CLINIC | Age: 88
End: 2023-02-14
Payer: MEDICARE

## 2023-02-14 VITALS
SYSTOLIC BLOOD PRESSURE: 186 MMHG | OXYGEN SATURATION: 97 % | WEIGHT: 118 LBS | HEIGHT: 65 IN | BODY MASS INDEX: 19.66 KG/M2 | HEART RATE: 92 BPM | DIASTOLIC BLOOD PRESSURE: 106 MMHG | TEMPERATURE: 97 F | RESPIRATION RATE: 20 BRPM

## 2023-02-14 DIAGNOSIS — E78.5 DYSLIPIDEMIA: ICD-10-CM

## 2023-02-14 DIAGNOSIS — G25.81 RESTLESS LEGS: ICD-10-CM

## 2023-02-14 DIAGNOSIS — R26.81 UNSTEADY GAIT: ICD-10-CM

## 2023-02-14 DIAGNOSIS — J44.9 CHRONIC OBSTRUCTIVE PULMONARY DISEASE, UNSPECIFIED COPD TYPE: ICD-10-CM

## 2023-02-14 DIAGNOSIS — Z91.81 AT HIGH RISK FOR FALLS: ICD-10-CM

## 2023-02-14 DIAGNOSIS — E03.9 ACQUIRED HYPOTHYROIDISM: ICD-10-CM

## 2023-02-14 DIAGNOSIS — I10 ESSENTIAL HYPERTENSION: Primary | ICD-10-CM

## 2023-02-14 DIAGNOSIS — R41.3 MEMORY LOSS: ICD-10-CM

## 2023-02-14 DIAGNOSIS — Z86.73 H/O: CVA (CEREBROVASCULAR ACCIDENT): ICD-10-CM

## 2023-02-14 DIAGNOSIS — E53.8 VITAMIN B12 DEFICIENCY: ICD-10-CM

## 2023-02-14 PROCEDURE — 1160F PR REVIEW ALL MEDS BY PRESCRIBER/CLIN PHARMACIST DOCUMENTED: ICD-10-PCS | Mod: ,,, | Performed by: NURSE PRACTITIONER

## 2023-02-14 PROCEDURE — 1126F AMNT PAIN NOTED NONE PRSNT: CPT | Mod: ,,, | Performed by: NURSE PRACTITIONER

## 2023-02-14 PROCEDURE — 1101F PT FALLS ASSESS-DOCD LE1/YR: CPT | Mod: ,,, | Performed by: NURSE PRACTITIONER

## 2023-02-14 PROCEDURE — 1160F RVW MEDS BY RX/DR IN RCRD: CPT | Mod: ,,, | Performed by: NURSE PRACTITIONER

## 2023-02-14 PROCEDURE — 99213 OFFICE O/P EST LOW 20 MIN: CPT | Mod: 25,,, | Performed by: NURSE PRACTITIONER

## 2023-02-14 PROCEDURE — 96372 THER/PROPH/DIAG INJ SC/IM: CPT | Mod: ,,, | Performed by: NURSE PRACTITIONER

## 2023-02-14 PROCEDURE — 3288F FALL RISK ASSESSMENT DOCD: CPT | Mod: ,,, | Performed by: NURSE PRACTITIONER

## 2023-02-14 PROCEDURE — 1126F PR PAIN SEVERITY QUANTIFIED, NO PAIN PRESENT: ICD-10-PCS | Mod: ,,, | Performed by: NURSE PRACTITIONER

## 2023-02-14 PROCEDURE — 96372 PR INJECTION,THERAP/PROPH/DIAG2ST, IM OR SUBCUT: ICD-10-PCS | Mod: ,,, | Performed by: NURSE PRACTITIONER

## 2023-02-14 PROCEDURE — 1159F MED LIST DOCD IN RCRD: CPT | Mod: ,,, | Performed by: NURSE PRACTITIONER

## 2023-02-14 PROCEDURE — 1101F PR PT FALLS ASSESS DOC 0-1 FALLS W/OUT INJ PAST YR: ICD-10-PCS | Mod: ,,, | Performed by: NURSE PRACTITIONER

## 2023-02-14 PROCEDURE — 99213 PR OFFICE/OUTPT VISIT, EST, LEVL III, 20-29 MIN: ICD-10-PCS | Mod: 25,,, | Performed by: NURSE PRACTITIONER

## 2023-02-14 PROCEDURE — 1159F PR MEDICATION LIST DOCUMENTED IN MEDICAL RECORD: ICD-10-PCS | Mod: ,,, | Performed by: NURSE PRACTITIONER

## 2023-02-14 PROCEDURE — 3288F PR FALLS RISK ASSESSMENT DOCUMENTED: ICD-10-PCS | Mod: ,,, | Performed by: NURSE PRACTITIONER

## 2023-02-14 RX ORDER — ROPINIROLE 0.5 MG/1
TABLET, FILM COATED ORAL
Qty: 90 TABLET | Refills: 1 | Status: SHIPPED | OUTPATIENT
Start: 2023-02-14 | End: 2023-07-14 | Stop reason: SDUPTHER

## 2023-02-14 RX ORDER — LISINOPRIL 20 MG/1
20 TABLET ORAL DAILY
Qty: 90 TABLET | Refills: 1 | Status: SHIPPED | OUTPATIENT
Start: 2023-02-14 | End: 2023-07-14 | Stop reason: SDUPTHER

## 2023-02-14 RX ORDER — AMLODIPINE BESYLATE 5 MG/1
5 TABLET ORAL DAILY
Qty: 90 TABLET | Refills: 1 | Status: SHIPPED | OUTPATIENT
Start: 2023-02-14 | End: 2023-02-15 | Stop reason: SDUPTHER

## 2023-02-14 RX ORDER — GABAPENTIN 100 MG/1
100 CAPSULE ORAL NIGHTLY
Qty: 90 CAPSULE | Refills: 1 | Status: SHIPPED | OUTPATIENT
Start: 2023-02-14 | End: 2023-06-01 | Stop reason: SDUPTHER

## 2023-02-14 RX ORDER — CYANOCOBALAMIN 1000 UG/ML
1000 INJECTION, SOLUTION INTRAMUSCULAR; SUBCUTANEOUS ONCE
Status: COMPLETED | OUTPATIENT
Start: 2023-02-14 | End: 2023-02-14

## 2023-02-14 RX ORDER — AMLODIPINE BESYLATE 5 MG/1
5 TABLET ORAL DAILY
Qty: 30 TABLET | Refills: 0 | Status: SHIPPED | OUTPATIENT
Start: 2023-02-14 | End: 2023-02-14 | Stop reason: SDUPTHER

## 2023-02-14 RX ORDER — ATORVASTATIN CALCIUM 20 MG/1
20 TABLET, FILM COATED ORAL NIGHTLY
Qty: 90 TABLET | Refills: 1 | Status: SHIPPED | OUTPATIENT
Start: 2023-02-14 | End: 2023-07-14 | Stop reason: SDUPTHER

## 2023-02-14 RX ADMIN — CYANOCOBALAMIN 1000 MCG: 1000 INJECTION, SOLUTION INTRAMUSCULAR; SUBCUTANEOUS at 09:02

## 2023-02-14 NOTE — PROGRESS NOTES
Subjective:       Patient ID: Gregory Plasencia is a 88 y.o. male.    Chief Complaint: Follow-up (1 week follow up)    Mr. Plasencia presents to clinic with his wife to review labs, TSH is low and free T4 elevated, unfortunately the patient has not adjusted medications. He reports he is sleeping better with remeron. Blood pressure is persistently elevated, despite taking medication this morning at 6:30AM, his wife states she is giving him his medications and they have reduced levothyroxine to 1/2 tablet. We will consult home health to monitor BP and repeat TSH level. Referral made at last appointment to evaluate dementia.    Follow-up  Pertinent negatives include no weakness.   Review of Systems   Constitutional: Negative.    Respiratory: Negative.     Cardiovascular: Negative.    Gastrointestinal: Negative.    Genitourinary: Negative.    Neurological:  Positive for memory loss. Negative for weakness.   Psychiatric/Behavioral:  Positive for confusion. Negative for dysphoric mood and sleep disturbance.        Objective:      Physical Exam  Vitals and nursing note reviewed.   Constitutional:       Appearance: Normal appearance.   HENT:      Head: Normocephalic.   Pulmonary:      Effort: Pulmonary effort is normal. No respiratory distress.   Musculoskeletal:         General: Normal range of motion.   Skin:     General: Skin is warm and dry.   Neurological:      General: No focal deficit present.      Mental Status: He is alert. Mental status is at baseline.   Psychiatric:         Attention and Perception: Attention normal.         Speech: Speech normal.         Behavior: Behavior normal. Behavior is cooperative.       Assessment:       Problem List Items Addressed This Visit          Neuro    H/O: CVA (cerebrovascular accident)       Pulmonary    COPD (chronic obstructive pulmonary disease)       Cardiac/Vascular    Essential hypertension - Primary    Dyslipidemia       Endocrine    Hypothyroidism     Other Visit Diagnoses        Vitamin B12 deficiency        Relevant Medications    cyanocobalamin injection 1,000 mcg (Start on 2/14/2023 11:00 AM)    Memory loss        Unsteady gait        At high risk for falls                  Plan:        Start Vitamin B12 injections twice monthly X 3 months.   Reduce levothyroxine to 1/2 half tablet 88mcg daily.   Home Health referral - repeat TSH in 6 weeks, monitor BP (added amlodipine 5mg QD at this appointment) - PLEASE SEND BP LOG TO CLINIC. VIT B12 1000mcg injection every two weeks X3 months. Please have  and psych nurse see patient for dementia.   Schedule follow up in 3 months.

## 2023-02-15 RX ORDER — AMLODIPINE BESYLATE 5 MG/1
5 TABLET ORAL DAILY
Qty: 30 TABLET | Refills: 1 | Status: SHIPPED | OUTPATIENT
Start: 2023-02-15 | End: 2023-06-01 | Stop reason: SDUPTHER

## 2023-03-14 ENCOUNTER — OFFICE VISIT (OUTPATIENT)
Dept: NEUROLOGY | Facility: CLINIC | Age: 88
End: 2023-03-14
Payer: MEDICARE

## 2023-03-14 VITALS
HEART RATE: 84 BPM | SYSTOLIC BLOOD PRESSURE: 148 MMHG | DIASTOLIC BLOOD PRESSURE: 96 MMHG | HEIGHT: 65 IN | OXYGEN SATURATION: 97 % | RESPIRATION RATE: 18 BRPM | WEIGHT: 118 LBS | BODY MASS INDEX: 19.66 KG/M2

## 2023-03-14 DIAGNOSIS — R41.3 OTHER AMNESIA: ICD-10-CM

## 2023-03-14 DIAGNOSIS — Z86.73 H/O: CVA (CEREBROVASCULAR ACCIDENT): Primary | ICD-10-CM

## 2023-03-14 DIAGNOSIS — E03.9 HYPOTHYROIDISM, UNSPECIFIED TYPE: ICD-10-CM

## 2023-03-14 DIAGNOSIS — E53.8 VITAMIN B12 DEFICIENCY: ICD-10-CM

## 2023-03-14 DIAGNOSIS — E55.9 VITAMIN D DEFICIENCY: ICD-10-CM

## 2023-03-14 DIAGNOSIS — I63.89 OTHER CEREBRAL INFARCTION: ICD-10-CM

## 2023-03-14 DIAGNOSIS — R41.3 MEMORY LOSS: ICD-10-CM

## 2023-03-14 DIAGNOSIS — I10 ESSENTIAL HYPERTENSION: ICD-10-CM

## 2023-03-14 DIAGNOSIS — E78.5 DYSLIPIDEMIA: ICD-10-CM

## 2023-03-14 PROCEDURE — 99203 OFFICE O/P NEW LOW 30 MIN: CPT | Mod: S$PBB,,, | Performed by: NURSE PRACTITIONER

## 2023-03-14 PROCEDURE — 1159F PR MEDICATION LIST DOCUMENTED IN MEDICAL RECORD: ICD-10-PCS | Mod: CPTII,,, | Performed by: NURSE PRACTITIONER

## 2023-03-14 PROCEDURE — 99203 PR OFFICE/OUTPT VISIT, NEW, LEVL III, 30-44 MIN: ICD-10-PCS | Mod: S$PBB,,, | Performed by: NURSE PRACTITIONER

## 2023-03-14 PROCEDURE — 1159F MED LIST DOCD IN RCRD: CPT | Mod: CPTII,,, | Performed by: NURSE PRACTITIONER

## 2023-03-14 PROCEDURE — 1160F PR REVIEW ALL MEDS BY PRESCRIBER/CLIN PHARMACIST DOCUMENTED: ICD-10-PCS | Mod: CPTII,,, | Performed by: NURSE PRACTITIONER

## 2023-03-14 PROCEDURE — 1160F RVW MEDS BY RX/DR IN RCRD: CPT | Mod: CPTII,,, | Performed by: NURSE PRACTITIONER

## 2023-03-14 PROCEDURE — 99215 OFFICE O/P EST HI 40 MIN: CPT | Mod: PBBFAC | Performed by: NURSE PRACTITIONER

## 2023-03-14 NOTE — PATIENT INSTRUCTIONS
Dementia panel  CT head  Stop the aspirin  Continue the plavix 75mg daily  MMSe here in clinic 18/30 - reading and writing limited  Release for records from Dr. Gomez

## 2023-03-14 NOTE — PROGRESS NOTES
Subjective:       Patient ID: Gregory Plasencia is a 89 y.o. male     Chief Complaint:    Chief Complaint   Patient presents with    Referral    Memory Loss        Allergies:  Gadolinium-containing contrast media    Current Medications:    Outpatient Encounter Medications as of 3/14/2023   Medication Sig Dispense Refill    amLODIPine (NORVASC) 5 MG tablet Take 1 tablet (5 mg total) by mouth once daily. 30 tablet 1    aspirin (ECOTRIN) 81 MG EC tablet Take 81 mg by mouth once daily.      atorvastatin (LIPITOR) 20 MG tablet Take 1 tablet (20 mg total) by mouth every evening. 90 tablet 1    clopidogreL (PLAVIX) 75 mg tablet Take 1 tablet (75 mg total) by mouth once daily. 90 tablet 1    cyanocobalamin, vitamin B-12, 1,000 mcg/mL Kit Inject 1 each as directed every 14 (fourteen) days. 2 kit 2    gabapentin (NEURONTIN) 100 MG capsule Take 1 capsule (100 mg total) by mouth every evening. 90 capsule 1    levothyroxine (SYNTHROID) 88 MCG tablet Take 88 mcg by mouth before breakfast.      lisinopriL (PRINIVIL,ZESTRIL) 20 MG tablet Take 1 tablet (20 mg total) by mouth once daily. 90 tablet 1    rOPINIRole (REQUIP) 0.5 MG tablet Take 1 tab by mouth at bedtime for restless legs. 90 tablet 1    [DISCONTINUED] mirtazapine (REMERON) 7.5 MG Tab Take 1 tablet (7.5 mg total) by mouth every evening. (Patient not taking: Reported on 3/14/2023) 30 tablet 2     No facility-administered encounter medications on file as of 3/14/2023.       History of Present Illness  88 y/o male new referral to neurology for reported memory impairment.    Prior CVA, was followed by Dr. 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  Apparenlty he had patient on duel therapy with ASA and palvix since that time.  Patient reports at time of the CVA he was on ASA, it is not recommended for stroke prevention to stay on duel therapy more than 90 days, so I do suggest patient can stop his aspirin.  He  denies any cardiology complications or reasons that he would be on duel therapy.    He has known thyroid disorder, with TSH a month ago noted 0.018, but they are making med adjustments with this.  His B12 was noted WNL    He is not driving, apparently due to his cognitive complications.  His MMSE here in clinic is 18/30, he has difficulty with reading and writing, thus affecting his score.  Hsi brother in law is with him today.  He does report patient with frequent forgetting of names of familiar people, losing items, repeating conversations as well.    I did discuss possible sleep study, however patient quickly reports he has no intentions of having this done.  He is open to having head imaging and labs.    Patient is not current open to starting any new medications, would like to get testing done first.  Declines neuropsychological evaluation.  If all is noted negative for acute cause, I would suggest starting aricept 5mg daily for a month then 10mg daily         Review of Systems  Review of Systems   Constitutional:  Negative for diaphoresis and fever.   HENT:  Negative for congestion, hearing loss and tinnitus.    Eyes:  Negative for blurred vision, double vision, photophobia, discharge and redness.   Respiratory:  Negative for cough and shortness of breath.    Cardiovascular:  Negative for chest pain.   Gastrointestinal:  Negative for abdominal pain, nausea and vomiting.   Musculoskeletal:  Negative for back pain, joint pain, myalgias and neck pain.   Skin:  Negative for itching and rash.   Neurological:  Negative for dizziness, tremors, sensory change, speech change, focal weakness, seizures, loss of consciousness, weakness and headaches.   Psychiatric/Behavioral:  Positive for memory loss. Negative for depression and hallucinations. The patient does not have insomnia.    All other systems reviewed and are negative.   Objective:     NEUROLOGICAL EXAMINATION:     MENTAL STATUS   Oriented to person, place, and  time.   Registration: recalls 2 of 3 objects. Recall at 5 minutes: recalls 1 of 3 objects.   Attention: decreased. Concentration: decreased.   Speech: speech is normal   Level of consciousness: alert  Knowledge: poor and inconsistent with education.   Abnormal comprehension. Praxis: abnormal     CRANIAL NERVES     CN II   Visual fields full to confrontation.   Visual acuity: normal  Right visual field deficit: none  Left visual field deficit: none     CN III, IV, VI   Pupils are equal, round, and reactive to light.  Extraocular motions are normal.   Right pupil: Size: 3 mm. Shape: regular. Reactivity: brisk. Consensual response: intact. Accommodation: intact.   Left pupil: Size: 3 mm. Shape: regular. Reactivity: brisk. Consensual response: intact. Accommodation: intact.   CN III: no CN III palsy  CN VI: no CN VI palsy  Nystagmus: none   Diplopia: none  Upgaze: normal  Downgaze: normal  Conjugate gaze: present  Vestibulo-ocular reflex: present    CN V   Facial sensation intact.   Right facial sensation deficit: none  Left facial sensation deficit: none  Right corneal reflex: normal  Left corneal reflex: normal    CN VII   Facial expression full, symmetric.   Right facial weakness: none  Left facial weakness: none  Right taste: normal  Left taste: normal    CN VIII   CN VIII normal.   Hearing: intact    CN IX, X   CN IX normal.   CN X normal.   Palate: symmetric    CN XI   CN XI normal.   Right sternocleidomastoid strength: normal  Left sternocleidomastoid strength: normal  Right trapezius strength: normal  Left trapezius strength: normal    CN XII   CN XII normal.   Tongue: not atrophic  Fasciculations: absent  Tongue deviation: none    MOTOR EXAM   Muscle bulk: normal  Overall muscle tone: normal  Right arm tone: normal  Left arm tone: normal  Right arm pronator drift: absent  Left arm pronator drift: absent  Right leg tone: normal  Left leg tone: normal    Strength   Right neck flexion: 5/5  Left neck flexion:  5/  Right neck extension: 5/  Left neck extension:   Right deltoid: 5/  Left deltoid:   Right biceps:   Left biceps:   Right triceps:   Left triceps:   Right wrist flexion:   Left wrist flexion:   Right wrist extension:   Left wrist extension:   Right interossei:   Left interossei:   Right iliopsoas:   Left iliopsoas:   Right quadriceps:   Left quadriceps:   Right hamstrin/5  Left hamstrin/5  Right anterior tibial:   Left anterior tibial:   Right posterior tibial:   Left posterior tibial:   Right gastroc:   Left gastroc:     REFLEXES     Reflexes   Right brachioradialis: 2+  Left brachioradialis: 2+  Right biceps: 2+  Left biceps: 2+  Right triceps: 2+  Left triceps: 2+  Right patellar: 2+  Left patellar: 2+  Right achilles: 2+  Left achilles: 2+  Right plantar: normal  Left plantar: normal  Right Hollingsworth: absent  Left Hollingsworth: absent  Right ankle clonus: absent  Left ankle clonus: absent  Right pendular knee jerk: absent  Left pendular knee jerk: absent    SENSORY EXAM   Light touch normal.   Right arm light touch: normal  Left arm light touch: normal  Right leg light touch: normal  Left leg light touch: normal  Vibration normal.   Right arm vibration: normal  Left arm vibration: normal  Right leg vibration: normal  Left leg vibration: normal  Proprioception normal.   Right arm proprioception: normal  Left arm proprioception: normal  Right leg proprioception: normal  Left leg proprioception: normal  Pinprick normal.   Right arm pinprick: normal  Left arm pinprick: normal  Right leg pinprick: normal  Left leg pinprick: normal  Graphesthesia: normal  Romberg: negative  Stereognosis: normal    GAIT AND COORDINATION     Gait  Gait: normal     Coordination   Finger to nose coordination: normal  Heel to shin coordination: normal  Tandem walking coordination: normal    Tremor   Resting tremor: absent  Intention tremor: absent  Action tremor:  absent     Physical Exam  Vitals and nursing note reviewed.   Constitutional:       Appearance: Normal appearance.   HENT:      Head: Normocephalic.   Eyes:      Extraocular Movements: EOM normal.      Pupils: Pupils are equal, round, and reactive to light.   Cardiovascular:      Rate and Rhythm: Normal rate and regular rhythm.      Pulses: Normal pulses.      Heart sounds: Normal heart sounds.   Pulmonary:      Effort: Pulmonary effort is normal.      Breath sounds: Normal breath sounds.   Musculoskeletal:         General: Normal range of motion.      Cervical back: Normal range of motion and neck supple.   Skin:     General: Skin is warm and dry.   Neurological:      General: No focal deficit present.      Mental Status: He is alert and oriented to person, place, and time.      Cranial Nerves: No cranial nerve deficit.      Sensory: No sensory deficit.      Motor: No weakness.      Coordination: Coordination normal. Finger-Nose-Finger Test, Heel to Shin Test and Romberg Test normal.      Gait: Gait is intact. Gait and tandem walk normal.      Deep Tendon Reflexes: Reflexes normal.      Reflex Scores:       Tricep reflexes are 2+ on the right side and 2+ on the left side.       Bicep reflexes are 2+ on the right side and 2+ on the left side.       Brachioradialis reflexes are 2+ on the right side and 2+ on the left side.       Patellar reflexes are 2+ on the right side and 2+ on the left side.       Achilles reflexes are 2+ on the right side and 2+ on the left side.  Psychiatric:         Attention and Perception: He is inattentive.         Mood and Affect: Mood normal.         Speech: Speech normal.         Behavior: Behavior normal. Behavior is cooperative.         Cognition and Memory: Cognition is impaired. Memory is impaired.        Assessment:     Problem List Items Addressed This Visit          Neuro    H/O: CVA (cerebrovascular accident) - Primary    Memory loss       Cardiac/Vascular    Essential  hypertension    Dyslipidemia       Endocrine    Hypothyroidism    Relevant Orders    TSH     Other Visit Diagnoses       Other amnesia        Relevant Orders    CT Head Without Contrast    Other cerebral infarction        Relevant Orders    CT Head Without Contrast    Ammonia    CBC Auto Differential    Comprehensive Metabolic Panel    Vitamin B12 deficiency        Vitamin D deficiency        Relevant Orders    Vitamin D             Primary Diagnosis and ICD10  H/O: CVA (cerebrovascular accident) [Z86.73]    Plan:     Patient Instructions   Dementia panel  CT head  Stop the aspirin  Continue the plavix 75mg daily  MMSe here in clinic 18/30 - reading and writing limited  Release for records from Dr. Gomez    Medications Discontinued During This Encounter   Medication Reason    mirtazapine (REMERON) 7.5 MG Tab        Requested Prescriptions      No prescriptions requested or ordered in this encounter       Orders Placed This Encounter   Procedures    CT Head Without Contrast    Ammonia    CBC Auto Differential    Comprehensive Metabolic Panel    Vitamin D    TSH

## 2023-03-15 ENCOUNTER — TELEPHONE (OUTPATIENT)
Dept: NEUROLOGY | Facility: CLINIC | Age: 88
End: 2023-03-15
Payer: MEDICARE

## 2023-03-15 NOTE — TELEPHONE ENCOUNTER
Called pt and gave him the information below from MARIA DOLORES Gutierrez NP. He v/u.        ----- Message from ANDREI Choudhury sent at 3/15/2023  6:01 AM CDT -----  Labs are good

## 2023-03-27 ENCOUNTER — HOSPITAL ENCOUNTER (OUTPATIENT)
Dept: RADIOLOGY | Facility: HOSPITAL | Age: 88
Discharge: HOME | End: 2023-03-27
Attending: NURSE PRACTITIONER
Payer: MEDICARE

## 2023-03-27 DIAGNOSIS — R41.3 OTHER AMNESIA: ICD-10-CM

## 2023-03-27 DIAGNOSIS — I63.89 OTHER CEREBRAL INFARCTION: ICD-10-CM

## 2023-03-27 PROCEDURE — 70450 CT HEAD/BRAIN W/O DYE: CPT | Mod: TC

## 2023-03-27 PROCEDURE — 70450 CT HEAD/BRAIN W/O DYE: CPT | Mod: 26,,, | Performed by: RADIOLOGY

## 2023-03-27 PROCEDURE — 70450 CT HEAD WITHOUT CONTRAST: ICD-10-PCS | Mod: 26,,, | Performed by: RADIOLOGY

## 2023-03-29 ENCOUNTER — TELEPHONE (OUTPATIENT)
Dept: NEUROLOGY | Facility: CLINIC | Age: 88
End: 2023-03-29
Payer: MEDICARE

## 2023-03-29 NOTE — TELEPHONE ENCOUNTER
Called pt's wife and gave her the information below from MARIA DOLORES Gtuierrez NP. She v/u.        ----- Message from ANDREI Choudhury sent at 3/28/2023  5:55 AM CDT -----  Mild atrophy on the CT head, this can contribute to his memory impairment, no new findings such as new strokes are seen

## 2023-04-13 ENCOUNTER — OFFICE VISIT (OUTPATIENT)
Dept: NEUROLOGY | Facility: CLINIC | Age: 88
End: 2023-04-13
Payer: MEDICARE

## 2023-04-13 VITALS
WEIGHT: 119 LBS | BODY MASS INDEX: 19.83 KG/M2 | RESPIRATION RATE: 18 BRPM | HEIGHT: 65 IN | DIASTOLIC BLOOD PRESSURE: 74 MMHG | SYSTOLIC BLOOD PRESSURE: 124 MMHG

## 2023-04-13 DIAGNOSIS — Z86.73 H/O: CVA (CEREBROVASCULAR ACCIDENT): Primary | ICD-10-CM

## 2023-04-13 DIAGNOSIS — R41.3 MEMORY LOSS: ICD-10-CM

## 2023-04-13 DIAGNOSIS — I10 ESSENTIAL HYPERTENSION: ICD-10-CM

## 2023-04-13 DIAGNOSIS — E78.5 DYSLIPIDEMIA: ICD-10-CM

## 2023-04-13 PROCEDURE — 99214 OFFICE O/P EST MOD 30 MIN: CPT | Mod: PBBFAC | Performed by: NURSE PRACTITIONER

## 2023-04-13 PROCEDURE — 99212 OFFICE O/P EST SF 10 MIN: CPT | Mod: S$PBB,,, | Performed by: NURSE PRACTITIONER

## 2023-04-13 PROCEDURE — 1159F MED LIST DOCD IN RCRD: CPT | Mod: CPTII,,, | Performed by: NURSE PRACTITIONER

## 2023-04-13 PROCEDURE — 99212 PR OFFICE/OUTPT VISIT, EST, LEVL II, 10-19 MIN: ICD-10-PCS | Mod: S$PBB,,, | Performed by: NURSE PRACTITIONER

## 2023-04-13 PROCEDURE — 1160F RVW MEDS BY RX/DR IN RCRD: CPT | Mod: CPTII,,, | Performed by: NURSE PRACTITIONER

## 2023-04-13 PROCEDURE — 1160F PR REVIEW ALL MEDS BY PRESCRIBER/CLIN PHARMACIST DOCUMENTED: ICD-10-PCS | Mod: CPTII,,, | Performed by: NURSE PRACTITIONER

## 2023-04-13 PROCEDURE — 1159F PR MEDICATION LIST DOCUMENTED IN MEDICAL RECORD: ICD-10-PCS | Mod: CPTII,,, | Performed by: NURSE PRACTITIONER

## 2023-04-13 NOTE — PROGRESS NOTES
Subjective:       Patient ID: Gregory Plasencia is a 89 y.o. male     Chief Complaint:    Chief Complaint   Patient presents with    Follow-up    Stroke        Allergies:  Gadolinium-containing contrast media    Current Medications:    Outpatient Encounter Medications as of 4/13/2023   Medication Sig Dispense Refill    amLODIPine (NORVASC) 5 MG tablet Take 1 tablet (5 mg total) by mouth once daily. 30 tablet 1    atorvastatin (LIPITOR) 20 MG tablet Take 1 tablet (20 mg total) by mouth every evening. 90 tablet 1    clopidogreL (PLAVIX) 75 mg tablet Take 1 tablet (75 mg total) by mouth once daily. 90 tablet 1    cyanocobalamin, vitamin B-12, 1,000 mcg/mL Kit Inject 1 each as directed every 14 (fourteen) days. 2 kit 2    gabapentin (NEURONTIN) 100 MG capsule Take 1 capsule (100 mg total) by mouth every evening. 90 capsule 1    levothyroxine (SYNTHROID) 88 MCG tablet Take 88 mcg by mouth before breakfast.      lisinopriL (PRINIVIL,ZESTRIL) 20 MG tablet Take 1 tablet (20 mg total) by mouth once daily. 90 tablet 1    rOPINIRole (REQUIP) 0.5 MG tablet Take 1 tab by mouth at bedtime for restless legs. 90 tablet 1    [DISCONTINUED] aspirin (ECOTRIN) 81 MG EC tablet Take 81 mg by mouth once daily.       No facility-administered encounter medications on file as of 4/13/2023.       History of Present Illness  88 y/o male following in neurology for reported memory impairment and prior history of CVA.    Prior CVA, was followed by Dr. 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  Records obtained showed this was in corona radiata on the left.  Apparenlty he had patient on duel therapy with ASA and palvix since that time.  Patient reports at time of the CVA he was on ASA.  We stopped the ASA at his initial visit and continued the plavix 75mg daily.  We obtained CT head which showed chronic microvascular changes only no new CVA.  Dementia panel labs normal, he has chronic thyroid disorder, followed by primary care for  this.  He does not drive due to his reported cognitive complications.  Initial MMSE was 18/30, but he has limited reading and writing which affects his score.  Mainly with problems of frequently forgetting names, losing items, repeating conversations as main symptoms.  He declined sleep study.  Did not wish to start any new medications at initial visit pending workup.  Declined neuropsychological evaluation.    He and family report no worsening symptoms, actually maybe doing a little better.         Review of Systems  Review of Systems   Constitutional:  Negative for diaphoresis and fever.   HENT:  Negative for congestion, hearing loss and tinnitus.    Eyes:  Negative for blurred vision, double vision, photophobia, discharge and redness.   Respiratory:  Negative for cough and shortness of breath.    Cardiovascular:  Negative for chest pain.   Gastrointestinal:  Negative for abdominal pain, nausea and vomiting.   Musculoskeletal:  Negative for back pain, joint pain, myalgias and neck pain.   Skin:  Negative for itching and rash.   Neurological:  Negative for dizziness, tremors, sensory change, speech change, focal weakness, seizures, loss of consciousness, weakness and headaches.   Psychiatric/Behavioral:  Positive for memory loss. Negative for depression and hallucinations. The patient does not have insomnia.    All other systems reviewed and are negative.   Objective:     NEUROLOGICAL EXAMINATION:     MENTAL STATUS   Oriented to person, place, and time.   Registration: recalls 2 of 3 objects. Recall at 5 minutes: recalls 1 of 3 objects.   Attention: decreased. Concentration: decreased.   Speech: speech is normal   Level of consciousness: alert  Knowledge: poor and inconsistent with education.   Abnormal comprehension. Praxis: abnormal     CRANIAL NERVES     CN II   Visual fields full to confrontation.   Visual acuity: normal  Right visual field deficit: none  Left visual field deficit: none     CN III, IV, VI    Pupils are equal, round, and reactive to light.  Extraocular motions are normal.   Right pupil: Size: 3 mm. Shape: regular. Reactivity: brisk. Consensual response: intact. Accommodation: intact.   Left pupil: Size: 3 mm. Shape: regular. Reactivity: brisk. Consensual response: intact. Accommodation: intact.   CN III: no CN III palsy  CN VI: no CN VI palsy  Nystagmus: none   Diplopia: none  Upgaze: normal  Downgaze: normal  Conjugate gaze: present  Vestibulo-ocular reflex: present    CN V   Facial sensation intact.   Right facial sensation deficit: none  Left facial sensation deficit: none  Right corneal reflex: normal  Left corneal reflex: normal    CN VII   Facial expression full, symmetric.   Right facial weakness: none  Left facial weakness: none  Right taste: normal  Left taste: normal    CN VIII   CN VIII normal.   Hearing: intact    CN IX, X   CN IX normal.   CN X normal.   Palate: symmetric    CN XI   CN XI normal.   Right sternocleidomastoid strength: normal  Left sternocleidomastoid strength: normal  Right trapezius strength: normal  Left trapezius strength: normal    CN XII   CN XII normal.   Tongue: not atrophic  Fasciculations: absent  Tongue deviation: none    MOTOR EXAM   Muscle bulk: normal  Overall muscle tone: normal  Right arm tone: normal  Left arm tone: normal  Right arm pronator drift: absent  Left arm pronator drift: absent  Right leg tone: normal  Left leg tone: normal    Strength   Right neck flexion: 5/5  Left neck flexion: 5/5  Right neck extension: 5/5  Left neck extension: 5/5  Right deltoid: 5/5  Left deltoid: 5/5  Right biceps: 5/5  Left biceps: 5/5  Right triceps: 5/5  Left triceps: 5/5  Right wrist flexion: 5/5  Left wrist flexion: 5/5  Right wrist extension: 5/5  Left wrist extension: 5/5  Right interossei: 5/5  Left interossei: 5/5  Right iliopsoas: 5/5  Left iliopsoas: 5/5  Right quadriceps: 5/5  Left quadriceps: 5/5  Right hamstrin/5  Left hamstrin/5  Right anterior  tibial: 5/5  Left anterior tibial: 5/5  Right posterior tibial: 5/5  Left posterior tibial: 5/5  Right gastroc: 5/5  Left gastroc: 5/5    REFLEXES     Reflexes   Right brachioradialis: 2+  Left brachioradialis: 2+  Right biceps: 2+  Left biceps: 2+  Right triceps: 2+  Left triceps: 2+  Right patellar: 2+  Left patellar: 2+  Right achilles: 2+  Left achilles: 2+  Right plantar: normal  Left plantar: normal  Right Hollingsworth: absent  Left Hollingsworth: absent  Right ankle clonus: absent  Left ankle clonus: absent  Right pendular knee jerk: absent  Left pendular knee jerk: absent    SENSORY EXAM   Light touch normal.   Right arm light touch: normal  Left arm light touch: normal  Right leg light touch: normal  Left leg light touch: normal  Vibration normal.   Right arm vibration: normal  Left arm vibration: normal  Right leg vibration: normal  Left leg vibration: normal  Proprioception normal.   Right arm proprioception: normal  Left arm proprioception: normal  Right leg proprioception: normal  Left leg proprioception: normal  Pinprick normal.   Right arm pinprick: normal  Left arm pinprick: normal  Right leg pinprick: normal  Left leg pinprick: normal  Graphesthesia: normal  Romberg: negative  Stereognosis: normal    GAIT AND COORDINATION     Gait  Gait: normal     Coordination   Finger to nose coordination: normal  Heel to shin coordination: normal  Tandem walking coordination: normal    Tremor   Resting tremor: absent  Intention tremor: absent  Action tremor: absent     Physical Exam  Vitals and nursing note reviewed.   Constitutional:       Appearance: Normal appearance.   HENT:      Head: Normocephalic.   Eyes:      Extraocular Movements: EOM normal.      Pupils: Pupils are equal, round, and reactive to light.   Cardiovascular:      Rate and Rhythm: Normal rate and regular rhythm.      Pulses: Normal pulses.      Heart sounds: Normal heart sounds.   Pulmonary:      Effort: Pulmonary effort is normal.      Breath sounds:  Normal breath sounds.   Musculoskeletal:         General: Normal range of motion.      Cervical back: Normal range of motion and neck supple.   Skin:     General: Skin is warm and dry.   Neurological:      General: No focal deficit present.      Mental Status: He is alert and oriented to person, place, and time.      Cranial Nerves: No cranial nerve deficit.      Sensory: No sensory deficit.      Motor: No weakness.      Coordination: Coordination normal. Finger-Nose-Finger Test, Heel to Shin Test and Romberg Test normal.      Gait: Gait is intact. Gait and tandem walk normal.      Deep Tendon Reflexes: Reflexes normal.      Reflex Scores:       Tricep reflexes are 2+ on the right side and 2+ on the left side.       Bicep reflexes are 2+ on the right side and 2+ on the left side.       Brachioradialis reflexes are 2+ on the right side and 2+ on the left side.       Patellar reflexes are 2+ on the right side and 2+ on the left side.       Achilles reflexes are 2+ on the right side and 2+ on the left side.  Psychiatric:         Attention and Perception: He is inattentive.         Mood and Affect: Mood normal.         Speech: Speech normal.         Behavior: Behavior normal. Behavior is cooperative.         Cognition and Memory: Cognition is impaired. Memory is impaired.        Assessment:     Problem List Items Addressed This Visit    None         Primary Diagnosis and ICD10  No primary diagnosis found.    Plan:     Patient Instructions   Declines changes in treatment plan  Continue good eating and sleep habits    Medications Discontinued During This Encounter   Medication Reason    aspirin (ECOTRIN) 81 MG EC tablet          Requested Prescriptions      No prescriptions requested or ordered in this encounter       No orders of the defined types were placed in this encounter.

## 2023-04-25 ENCOUNTER — DOCUMENT SCAN (OUTPATIENT)
Dept: HOME HEALTH SERVICES | Facility: HOSPITAL | Age: 88
End: 2023-04-25
Payer: MEDICARE

## 2023-05-11 ENCOUNTER — DOCUMENT SCAN (OUTPATIENT)
Dept: HOME HEALTH SERVICES | Facility: HOSPITAL | Age: 88
End: 2023-05-11
Payer: MEDICARE

## 2023-05-17 ENCOUNTER — EXTERNAL HOME HEALTH (OUTPATIENT)
Dept: HOME HEALTH SERVICES | Facility: HOSPITAL | Age: 88
End: 2023-05-17
Payer: MEDICARE

## 2023-05-19 ENCOUNTER — OFFICE VISIT (OUTPATIENT)
Dept: FAMILY MEDICINE | Facility: CLINIC | Age: 88
End: 2023-05-19
Payer: MEDICARE

## 2023-05-19 VITALS
RESPIRATION RATE: 18 BRPM | TEMPERATURE: 98 F | BODY MASS INDEX: 18.66 KG/M2 | DIASTOLIC BLOOD PRESSURE: 70 MMHG | OXYGEN SATURATION: 96 % | HEIGHT: 65 IN | WEIGHT: 112 LBS | HEART RATE: 60 BPM | SYSTOLIC BLOOD PRESSURE: 134 MMHG

## 2023-05-19 DIAGNOSIS — E78.5 DYSLIPIDEMIA: ICD-10-CM

## 2023-05-19 DIAGNOSIS — Z86.73 H/O: CVA (CEREBROVASCULAR ACCIDENT): ICD-10-CM

## 2023-05-19 DIAGNOSIS — J44.9 CHRONIC OBSTRUCTIVE PULMONARY DISEASE, UNSPECIFIED COPD TYPE: ICD-10-CM

## 2023-05-19 DIAGNOSIS — I10 ESSENTIAL HYPERTENSION: Primary | ICD-10-CM

## 2023-05-19 DIAGNOSIS — R41.3 MEMORY LOSS: ICD-10-CM

## 2023-05-19 DIAGNOSIS — E03.9 ACQUIRED HYPOTHYROIDISM: ICD-10-CM

## 2023-05-19 PROCEDURE — 1160F RVW MEDS BY RX/DR IN RCRD: CPT | Mod: ,,, | Performed by: NURSE PRACTITIONER

## 2023-05-19 PROCEDURE — 1159F MED LIST DOCD IN RCRD: CPT | Mod: ,,, | Performed by: NURSE PRACTITIONER

## 2023-05-19 PROCEDURE — 1101F PR PT FALLS ASSESS DOC 0-1 FALLS W/OUT INJ PAST YR: ICD-10-PCS | Mod: ,,, | Performed by: NURSE PRACTITIONER

## 2023-05-19 PROCEDURE — 1160F PR REVIEW ALL MEDS BY PRESCRIBER/CLIN PHARMACIST DOCUMENTED: ICD-10-PCS | Mod: ,,, | Performed by: NURSE PRACTITIONER

## 2023-05-19 PROCEDURE — 3288F PR FALLS RISK ASSESSMENT DOCUMENTED: ICD-10-PCS | Mod: ,,, | Performed by: NURSE PRACTITIONER

## 2023-05-19 PROCEDURE — 1159F PR MEDICATION LIST DOCUMENTED IN MEDICAL RECORD: ICD-10-PCS | Mod: ,,, | Performed by: NURSE PRACTITIONER

## 2023-05-19 PROCEDURE — 1126F AMNT PAIN NOTED NONE PRSNT: CPT | Mod: ,,, | Performed by: NURSE PRACTITIONER

## 2023-05-19 PROCEDURE — 99212 PR OFFICE/OUTPT VISIT, EST, LEVL II, 10-19 MIN: ICD-10-PCS | Mod: ,,, | Performed by: NURSE PRACTITIONER

## 2023-05-19 PROCEDURE — 1101F PT FALLS ASSESS-DOCD LE1/YR: CPT | Mod: ,,, | Performed by: NURSE PRACTITIONER

## 2023-05-19 PROCEDURE — 3288F FALL RISK ASSESSMENT DOCD: CPT | Mod: ,,, | Performed by: NURSE PRACTITIONER

## 2023-05-19 PROCEDURE — 99212 OFFICE O/P EST SF 10 MIN: CPT | Mod: ,,, | Performed by: NURSE PRACTITIONER

## 2023-05-19 PROCEDURE — 1126F PR PAIN SEVERITY QUANTIFIED, NO PAIN PRESENT: ICD-10-PCS | Mod: ,,, | Performed by: NURSE PRACTITIONER

## 2023-05-19 RX ORDER — ALBUTEROL SULFATE 90 UG/1
2 AEROSOL, METERED RESPIRATORY (INHALATION) EVERY 4 HOURS PRN
COMMUNITY
Start: 2023-05-08

## 2023-05-19 NOTE — PROGRESS NOTES
ANDREI Tejeda        PATIENT NAME: Gregory Plasencia  : 1934  DATE: 23  MRN: [id number]      Billing Provider: ANDREI Tejeda  Level of Service:   Patient PCP Information       Provider PCP Type    ANDREI Tejeda [facility]            Reason for Visit / Chief Complaint: Hypertension (3 month follow up for elevated bp. Pt's wife reports bp has been up/down, states the  nurse has been coming out weekly to help with meds and bp's. Pt has all meds with him today at visit. Pt is not fasting)         History of Present Illness / Problem Focused Workflow     Gregory Plasencia presents to the clinic with Hypertension (3 month follow up for elevated bp. Pt's wife reports bp has been up/down, states the  nurse has been coming out weekly to help with meds and bp's. Pt has all meds with him today at visit. Pt is not fasting)     Mr. Plasencia presents to clinic with his wife, neurology note and labs reviewed today. The patient denies complaints, he is accompanied by his wife who assists with his medications.      Review of Systems     Review of Systems   Constitutional: Negative.    Respiratory: Negative.     Cardiovascular: Negative.    Gastrointestinal: Negative.    Genitourinary: Negative.    Neurological: Negative.       Medical / Social / Family History     Past Medical History:   Diagnosis Date    Hypertension     Hypothyroidism, unspecified     Stroke        Past Surgical History:   Procedure Laterality Date    BACK SURGERY         Social History  Mr. Gregory Plasencia   reports that he has never smoked. He has quit using smokeless tobacco.  His smokeless tobacco use included chew. He reports that he does not drink alcohol and does not use drugs.    Family History  'carie Plasencia  family history is not on file.    Medications and Allergies     Medications  Outpatient Medications Marked as Taking for the 23 encounter (Office Visit) with ANDREI Tejeda   Medication Sig  Dispense Refill    albuterol (PROVENTIL/VENTOLIN HFA) 90 mcg/actuation inhaler Inhale 2 puffs into the lungs every 4 (four) hours as needed.      amLODIPine (NORVASC) 5 MG tablet Take 1 tablet (5 mg total) by mouth once daily. 30 tablet 1    atorvastatin (LIPITOR) 20 MG tablet Take 1 tablet (20 mg total) by mouth every evening. 90 tablet 1    clopidogreL (PLAVIX) 75 mg tablet Take 1 tablet (75 mg total) by mouth once daily. 90 tablet 1    cyanocobalamin, vitamin B-12, 1,000 mcg/mL Kit Inject 1 each as directed every 14 (fourteen) days. 2 kit 2    gabapentin (NEURONTIN) 100 MG capsule Take 1 capsule (100 mg total) by mouth every evening. 90 capsule 1    lisinopriL (PRINIVIL,ZESTRIL) 20 MG tablet Take 1 tablet (20 mg total) by mouth once daily. 90 tablet 1       Allergies  Review of patient's allergies indicates:   Allergen Reactions    Gadolinium-containing contrast media          Vitals:    05/19/23 0935   BP: 134/70   Pulse: 60   Resp: 18   Temp: 98.4 °F (36.9 °C)     Physical Exam  Constitutional:       General: He is not in acute distress.     Appearance: Normal appearance.   HENT:      Head: Normocephalic.   Eyes:      Pupils: Pupils are equal, round, and reactive to light.   Cardiovascular:      Rate and Rhythm: Normal rate and regular rhythm.      Pulses: Normal pulses.      Heart sounds: Normal heart sounds.   Pulmonary:      Effort: Pulmonary effort is normal. No respiratory distress.      Breath sounds: Normal breath sounds.   Chest:      Chest wall: No tenderness.   Abdominal:      General: Bowel sounds are normal.      Palpations: Abdomen is soft.      Tenderness: There is no abdominal tenderness.   Musculoskeletal:         General: Normal range of motion.      Cervical back: Normal range of motion and neck supple.   Skin:     General: Skin is warm and dry.   Neurological:      Mental Status: He is alert. Mental status is at baseline.   Psychiatric:         Behavior: Behavior normal.          Lab Results    Component Value Date    WBC 8.46 03/14/2023    HGB 12.9 (L) 03/14/2023    HCT 41.2 03/14/2023    MCV 87.7 03/14/2023     03/14/2023          Sodium   Date Value Ref Range Status   03/14/2023 136 136 - 145 mmol/L Final     Potassium   Date Value Ref Range Status   03/14/2023 4.4 3.5 - 5.1 mmol/L Final     Chloride   Date Value Ref Range Status   03/14/2023 105 98 - 107 mmol/L Final     CO2   Date Value Ref Range Status   03/14/2023 27 21 - 32 mmol/L Final     Glucose   Date Value Ref Range Status   03/14/2023 96 74 - 106 mg/dL Final     BUN   Date Value Ref Range Status   03/14/2023 27 (H) 7 - 18 mg/dL Final     Creatinine   Date Value Ref Range Status   03/14/2023 1.09 0.70 - 1.30 mg/dL Final     Calcium   Date Value Ref Range Status   03/14/2023 9.6 8.5 - 10.1 mg/dL Final     Total Protein   Date Value Ref Range Status   03/14/2023 8.6 (H) 6.4 - 8.2 g/dL Final     Albumin   Date Value Ref Range Status   03/14/2023 3.9 3.5 - 5.0 g/dL Final     Bilirubin, Total   Date Value Ref Range Status   03/14/2023 0.5 >0.0 - 1.2 mg/dL Final     Alk Phos   Date Value Ref Range Status   03/14/2023 136 (H) 45 - 115 U/L Final     AST   Date Value Ref Range Status   03/14/2023 42 (H) 15 - 37 U/L Final     ALT   Date Value Ref Range Status   03/14/2023 35 16 - 61 U/L Final     Anion Gap   Date Value Ref Range Status   03/14/2023 8 7 - 16 mmol/L Final     eGFR   Date Value Ref Range Status   03/14/2023 65 >=60 mL/min/1.73m² Final        Lab Results   Component Value Date    CHOL 106 02/08/2023    CHOL 112 11/10/2022    CHOL 120 06/08/2021     Lab Results   Component Value Date    HDL 47 02/08/2023    HDL 54 11/10/2022    HDL 56 06/08/2021     Lab Results   Component Value Date    LDLCALC 41 02/08/2023    LDLCALC 44 11/10/2022    LDLCALC 42 06/08/2021     Lab Results   Component Value Date    TRIG 90 02/08/2023    TRIG 71 11/10/2022    TRIG 109 06/08/2021     Lab Results   Component Value Date    CHOLHDL 2.3 02/08/2023     CHOLHDL 2.1 11/10/2022    CHOLHDL 2.1 06/08/2021        No results found for: LABA1C, HGBA1C     Lab Results   Component Value Date    TSH 4.470 (H) 03/14/2023    FREET4 1.79 (H) 02/08/2023        PSA Total (ng/mL)   Date Value   11/10/2022 1.620          There are no preventive care reminders to display for this patient.    Problem List Items Addressed This Visit          Neuro    H/O: CVA (cerebrovascular accident)    Memory loss       Pulmonary    COPD (chronic obstructive pulmonary disease)       Cardiac/Vascular    Essential hypertension - Primary    Dyslipidemia       Endocrine    Hypothyroidism     Reviewed labs with patient, TSH slightly elevated, the patient's wife reports he is taking 1/2 of his levothyroxine dose.  We will repeat level again in 3 months.      Health Maintenance Topics with due status: Not Due       Topic Last Completion Date    Lipid Panel 02/08/2023       Future Appointments   Date Time Provider Department Center   10/16/2023  1:00 PM ANDREI Choudhury [facility]        There are no Patient Instructions on file for this visit.  No follow-ups on file.       Date of encounter: 5/19/23

## 2023-06-01 RX ORDER — AMLODIPINE BESYLATE 5 MG/1
5 TABLET ORAL DAILY
Qty: 90 TABLET | Refills: 1 | Status: SHIPPED | OUTPATIENT
Start: 2023-06-01 | End: 2023-07-14 | Stop reason: SDUPTHER

## 2023-06-01 RX ORDER — GABAPENTIN 100 MG/1
100 CAPSULE ORAL NIGHTLY
Qty: 90 CAPSULE | Refills: 1 | Status: SHIPPED | OUTPATIENT
Start: 2023-06-01 | End: 2023-07-14 | Stop reason: SDUPTHER

## 2023-07-09 DIAGNOSIS — Z71.89 COMPLEX CARE COORDINATION: ICD-10-CM

## 2023-07-14 ENCOUNTER — OFFICE VISIT (OUTPATIENT)
Dept: FAMILY MEDICINE | Facility: CLINIC | Age: 88
End: 2023-07-14
Payer: MEDICARE

## 2023-07-14 VITALS
OXYGEN SATURATION: 96 % | RESPIRATION RATE: 17 BRPM | TEMPERATURE: 98 F | HEART RATE: 68 BPM | BODY MASS INDEX: 18.66 KG/M2 | HEIGHT: 65 IN | WEIGHT: 112 LBS | SYSTOLIC BLOOD PRESSURE: 156 MMHG | DIASTOLIC BLOOD PRESSURE: 91 MMHG

## 2023-07-14 DIAGNOSIS — I10 ESSENTIAL HYPERTENSION: ICD-10-CM

## 2023-07-14 DIAGNOSIS — E78.5 DYSLIPIDEMIA: ICD-10-CM

## 2023-07-14 DIAGNOSIS — G25.81 RESTLESS LEGS: ICD-10-CM

## 2023-07-14 DIAGNOSIS — E03.9 ACQUIRED HYPOTHYROIDISM: ICD-10-CM

## 2023-07-14 DIAGNOSIS — Z86.73 H/O: CVA (CEREBROVASCULAR ACCIDENT): Primary | ICD-10-CM

## 2023-07-14 DIAGNOSIS — L25.0 CONTACT DERMATITIS DUE TO COSMETICS, UNSPECIFIED CONTACT DERMATITIS TYPE: ICD-10-CM

## 2023-07-14 PROCEDURE — 99213 OFFICE O/P EST LOW 20 MIN: CPT | Mod: ,,, | Performed by: NURSE PRACTITIONER

## 2023-07-14 PROCEDURE — 1160F RVW MEDS BY RX/DR IN RCRD: CPT | Mod: ,,, | Performed by: NURSE PRACTITIONER

## 2023-07-14 PROCEDURE — 3288F PR FALLS RISK ASSESSMENT DOCUMENTED: ICD-10-PCS | Mod: ,,, | Performed by: NURSE PRACTITIONER

## 2023-07-14 PROCEDURE — 1159F MED LIST DOCD IN RCRD: CPT | Mod: ,,, | Performed by: NURSE PRACTITIONER

## 2023-07-14 PROCEDURE — 1101F PT FALLS ASSESS-DOCD LE1/YR: CPT | Mod: ,,, | Performed by: NURSE PRACTITIONER

## 2023-07-14 PROCEDURE — 3288F FALL RISK ASSESSMENT DOCD: CPT | Mod: ,,, | Performed by: NURSE PRACTITIONER

## 2023-07-14 PROCEDURE — 1159F PR MEDICATION LIST DOCUMENTED IN MEDICAL RECORD: ICD-10-PCS | Mod: ,,, | Performed by: NURSE PRACTITIONER

## 2023-07-14 PROCEDURE — 99213 PR OFFICE/OUTPT VISIT, EST, LEVL III, 20-29 MIN: ICD-10-PCS | Mod: ,,, | Performed by: NURSE PRACTITIONER

## 2023-07-14 PROCEDURE — 1160F PR REVIEW ALL MEDS BY PRESCRIBER/CLIN PHARMACIST DOCUMENTED: ICD-10-PCS | Mod: ,,, | Performed by: NURSE PRACTITIONER

## 2023-07-14 PROCEDURE — 1101F PR PT FALLS ASSESS DOC 0-1 FALLS W/OUT INJ PAST YR: ICD-10-PCS | Mod: ,,, | Performed by: NURSE PRACTITIONER

## 2023-07-14 PROCEDURE — 1126F PR PAIN SEVERITY QUANTIFIED, NO PAIN PRESENT: ICD-10-PCS | Mod: ,,, | Performed by: NURSE PRACTITIONER

## 2023-07-14 PROCEDURE — 1126F AMNT PAIN NOTED NONE PRSNT: CPT | Mod: ,,, | Performed by: NURSE PRACTITIONER

## 2023-07-14 RX ORDER — ROPINIROLE 0.5 MG/1
TABLET, FILM COATED ORAL
Qty: 90 TABLET | Refills: 1 | Status: SHIPPED | OUTPATIENT
Start: 2023-07-14 | End: 2023-12-14 | Stop reason: SDUPTHER

## 2023-07-14 RX ORDER — LEVOTHYROXINE SODIUM 88 UG/1
88 TABLET ORAL
Qty: 90 TABLET | Refills: 1 | Status: SHIPPED | OUTPATIENT
Start: 2023-07-14 | End: 2023-12-14 | Stop reason: SDUPTHER

## 2023-07-14 RX ORDER — LISINOPRIL 20 MG/1
20 TABLET ORAL DAILY
Qty: 90 TABLET | Refills: 1 | Status: SHIPPED | OUTPATIENT
Start: 2023-07-14 | End: 2023-12-14 | Stop reason: SDUPTHER

## 2023-07-14 RX ORDER — ATORVASTATIN CALCIUM 20 MG/1
20 TABLET, FILM COATED ORAL NIGHTLY
Qty: 90 TABLET | Refills: 1 | Status: SHIPPED | OUTPATIENT
Start: 2023-07-14 | End: 2023-12-14 | Stop reason: SDUPTHER

## 2023-07-14 RX ORDER — CLOPIDOGREL BISULFATE 75 MG/1
75 TABLET ORAL DAILY
Qty: 90 TABLET | Refills: 1 | Status: SHIPPED | OUTPATIENT
Start: 2023-07-14 | End: 2023-12-14 | Stop reason: SDUPTHER

## 2023-07-14 RX ORDER — AMLODIPINE BESYLATE 5 MG/1
5 TABLET ORAL DAILY
Qty: 90 TABLET | Refills: 1 | Status: SHIPPED | OUTPATIENT
Start: 2023-07-14 | End: 2024-01-10

## 2023-07-14 RX ORDER — PREDNISONE 20 MG/1
20 TABLET ORAL DAILY
Qty: 5 TABLET | Refills: 0 | Status: SHIPPED | OUTPATIENT
Start: 2023-07-14 | End: 2023-11-14

## 2023-07-14 RX ORDER — GABAPENTIN 100 MG/1
100 CAPSULE ORAL NIGHTLY
Qty: 90 CAPSULE | Refills: 1 | Status: SHIPPED | OUTPATIENT
Start: 2023-07-14 | End: 2023-12-14 | Stop reason: SDUPTHER

## 2023-07-14 NOTE — PROGRESS NOTES
[facility]    Chief Complaint      Chief Complaint   Patient presents with    Rash     Neck, head x2 weeks, burns and itchy, red       History of Present Illness      Gregory Plasencia is a 89 y.o. male. He  has a past medical history of Hypertension, Hypothyroidism, unspecified, and Stroke., who presents today for c/o a rash to his head and neck x2 weeks.  States it itches.  He believes it is likely from the Head and Shoulders shampoo he was using.  States the last few days he used a different soap and the rash and itching are getting better.     Past Medical History:  Past Medical History:   Diagnosis Date    Hypertension     Hypothyroidism, unspecified     Stroke        Past Surgical History:   has a past surgical history that includes Back surgery.    Social History:  Social History     Tobacco Use    Smoking status: Never    Smokeless tobacco: Former     Types: Chew   Substance Use Topics    Alcohol use: Never    Drug use: Never       I personally reviewed all past medical, surgical, and social.     Review of Systems   Constitutional:  Negative for fatigue and fever.   Respiratory:  Negative for shortness of breath.    Cardiovascular: Negative.    Musculoskeletal:  Negative for myalgias.   Skin:  Positive for rash.   Neurological:  Negative for dizziness, light-headedness and headaches.      Medications:  Outpatient Encounter Medications as of 7/14/2023   Medication Sig Dispense Refill    albuterol (PROVENTIL/VENTOLIN HFA) 90 mcg/actuation inhaler Inhale 2 puffs into the lungs every 4 (four) hours as needed.      [DISCONTINUED] amLODIPine (NORVASC) 5 MG tablet Take 1 tablet (5 mg total) by mouth once daily. 90 tablet 1    [DISCONTINUED] atorvastatin (LIPITOR) 20 MG tablet Take 1 tablet (20 mg total) by mouth every evening. 90 tablet 1    [DISCONTINUED] clopidogreL (PLAVIX) 75 mg tablet Take 1 tablet (75 mg total) by mouth once daily. 90 tablet 1    [DISCONTINUED] cyanocobalamin, vitamin B-12, 1,000  mcg/mL Kit Inject 1 each as directed every 14 (fourteen) days. 2 kit 2    [DISCONTINUED] gabapentin (NEURONTIN) 100 MG capsule Take 1 capsule (100 mg total) by mouth every evening. 90 capsule 1    [DISCONTINUED] levothyroxine (SYNTHROID) 88 MCG tablet Take 88 mcg by mouth before breakfast.      [DISCONTINUED] lisinopriL (PRINIVIL,ZESTRIL) 20 MG tablet Take 1 tablet (20 mg total) by mouth once daily. 90 tablet 1    [DISCONTINUED] rOPINIRole (REQUIP) 0.5 MG tablet Take 1 tab by mouth at bedtime for restless legs. 90 tablet 1    amLODIPine (NORVASC) 5 MG tablet Take 1 tablet (5 mg total) by mouth once daily. 90 tablet 1    atorvastatin (LIPITOR) 20 MG tablet Take 1 tablet (20 mg total) by mouth every evening. 90 tablet 1    clopidogreL (PLAVIX) 75 mg tablet Take 1 tablet (75 mg total) by mouth once daily. 90 tablet 1    cyanocobalamin, vitamin B-12, 1,000 mcg/mL Kit Inject 1 each as directed every 14 (fourteen) days. 2 kit 2    gabapentin (NEURONTIN) 100 MG capsule Take 1 capsule (100 mg total) by mouth every evening. 90 capsule 1    levothyroxine (SYNTHROID) 88 MCG tablet Take 1 tablet (88 mcg total) by mouth before breakfast. 90 tablet 1    lisinopriL (PRINIVIL,ZESTRIL) 20 MG tablet Take 1 tablet (20 mg total) by mouth once daily. 90 tablet 1    predniSONE (DELTASONE) 20 MG tablet Take 1 tablet (20 mg total) by mouth once daily. 5 tablet 0    rOPINIRole (REQUIP) 0.5 MG tablet Take 1 tab by mouth at bedtime for restless legs. 90 tablet 1     No facility-administered encounter medications on file as of 7/14/2023.       Allergies:  Review of patient's allergies indicates:   Allergen Reactions    Gadolinium-containing contrast media        Health Maintenance:    There is no immunization history on file for this patient.   Health Maintenance   Topic Date Due    TETANUS VACCINE  11/10/2023 (Originally 2/20/1952)    Lipid Panel  02/08/2028        Physical Exam      Vital Signs  Temp: 98.2 °F (36.8 °C)  Temp Source:  "Oral  Pulse: 68  Resp: 17  SpO2: 96 %  BP: (!) 156/91  BP Location: Right arm  Patient Position: Sitting  Pain Score: 0-No pain  Height and Weight  Height: 5' 5" (165.1 cm)  Weight: 50.8 kg (112 lb)  BSA (Calculated - sq m): 1.53 sq meters  BMI (Calculated): 18.6  Weight in (lb) to have BMI = 25: 149.9]    Physical Exam  Vitals and nursing note reviewed.   Constitutional:       Appearance: Normal appearance.   HENT:      Head: Normocephalic.      Right Ear: Hearing normal.      Left Ear: Hearing normal.      Nose: Nose normal.   Eyes:      General: Lids are normal.      Conjunctiva/sclera: Conjunctivae normal.   Neck:      Thyroid: No thyromegaly.   Cardiovascular:      Rate and Rhythm: Normal rate and regular rhythm.      Heart sounds: Normal heart sounds.   Pulmonary:      Effort: Pulmonary effort is normal.      Breath sounds: Normal breath sounds.   Musculoskeletal:         General: Normal range of motion.      Cervical back: Normal range of motion and neck supple.      Right lower leg: No edema.      Left lower leg: No edema.   Skin:     General: Skin is warm and dry.      Findings: Rash present. Rash is macular.          Neurological:      General: No focal deficit present.      Mental Status: He is alert and oriented to person, place, and time.      Gait: Gait is intact.        Laboratory:  CBC:  Recent Labs   Lab 11/10/22  0857 02/08/23  0853 03/14/23  1600   WBC 6.95 7.18 8.46   RBC 4.58 L 4.30 L 4.70   Hemoglobin 12.7 L 12.0 L 12.9 L   Hematocrit 41.2 37.9 L 41.2   Platelet Count 259 250 319   MCV 90.0 88.1 87.7   MCH 27.7 27.9 27.4   MCHC 30.8 L 31.7 L 31.3 L     CMP:  Recent Labs   Lab 11/10/22  0857 02/08/23  0853 03/14/23  1600   Glucose 89 96 96   Calcium 9.4 9.0 9.6   Albumin 4.1 3.3 L 3.9   Total Protein 7.7 6.8 8.6 H   Sodium 139 144 136   Potassium 3.8 3.4 L 4.4   CO2 24 28 27   Chloride 106 108 H 105   BUN 17 9 27 H   Alk Phos 113 130 H 136 H   ALT 29 23 35   AST 40 H 30 42 H   Bilirubin, Total " 1.0 0.7 0.5     LIPIDS:  Recent Labs   Lab 06/08/21  0911 11/10/22  0857 02/08/23  0853 03/14/23  1600   TSH  --  0.048 L 0.018 L 4.470 H   HDL Cholesterol 56 54 47  --    Cholesterol 120 112 106  --    Triglycerides 109 71 90  --    LDL Calculated 42 44 41  --    Cholesterol/HDL Ratio (Risk Factor) 2.1 2.1 2.3  --    Non-HDL 64 58 59  --      TSH:  Recent Labs   Lab 11/10/22  0857 02/08/23  0853 03/14/23  1600   TSH 0.048 L 0.018 L 4.470 H     A1C:        Assessment/Plan     Gregory Plasencia is a 89 y.o.male with:     1. Dyslipidemia  - atorvastatin (LIPITOR) 20 MG tablet; Take 1 tablet (20 mg total) by mouth every evening.  Dispense: 90 tablet; Refill: 1    2. Essential hypertension  - amLODIPine (NORVASC) 5 MG tablet; Take 1 tablet (5 mg total) by mouth once daily.  Dispense: 90 tablet; Refill: 1  - lisinopriL (PRINIVIL,ZESTRIL) 20 MG tablet; Take 1 tablet (20 mg total) by mouth once daily.  Dispense: 90 tablet; Refill: 1    3. Restless legs  - gabapentin (NEURONTIN) 100 MG capsule; Take 1 capsule (100 mg total) by mouth every evening.  Dispense: 90 capsule; Refill: 1  - rOPINIRole (REQUIP) 0.5 MG tablet; Take 1 tab by mouth at bedtime for restless legs.  Dispense: 90 tablet; Refill: 1    4. H/O: CVA (cerebrovascular accident)  - clopidogreL (PLAVIX) 75 mg tablet; Take 1 tablet (75 mg total) by mouth once daily.  Dispense: 90 tablet; Refill: 1    5. Acquired hypothyroidism  - levothyroxine (SYNTHROID) 88 MCG tablet; Take 1 tablet (88 mcg total) by mouth before breakfast.  Dispense: 90 tablet; Refill: 1    6. Contact dermatitis due to cosmetics, unspecified contact dermatitis type  - predniSONE (DELTASONE) 20 MG tablet; Take 1 tablet (20 mg total) by mouth once daily.  Dispense: 5 tablet; Refill: 0     Avoid Head and Shoulders shampoo      Total time spent face-to-face and non-face-to-face coordinating care for this encounter was: 20 minutes     Chronic conditions status updated as per HPI.  Other than changes  above, cont current medications and maintain follow up with specialists.  Return to clinic in 3 month(s).    Aggie Cruz, NIVIAP  [facility]

## 2023-08-16 ENCOUNTER — PATIENT OUTREACH (OUTPATIENT)
Dept: ADMINISTRATIVE | Facility: HOSPITAL | Age: 88
End: 2023-08-16

## 2023-08-16 NOTE — PROGRESS NOTES
Health maintenance record review for population health care gaps    Reached out to patient via phone to set up patient portal.   Patient not due for follow up appointment 11/14/23    Health Maintenance Due   Topic Date Due    Pneumococcal Vaccines (Age 65+) (1 - PCV) Never done    TETANUS VACCINE  Never done    Shingles Vaccine (1 of 2) Never done    COVID-19 Vaccine (5 - Moderna series) 02/28/2023

## 2023-08-21 ENCOUNTER — HOSPITAL ENCOUNTER (EMERGENCY)
Facility: HOSPITAL | Age: 88
Discharge: HOME | End: 2023-08-21
Attending: EMERGENCY MEDICINE
Payer: MEDICARE

## 2023-08-21 VITALS
RESPIRATION RATE: 18 BRPM | WEIGHT: 117 LBS | BODY MASS INDEX: 18.8 KG/M2 | HEIGHT: 66 IN | HEART RATE: 81 BPM | TEMPERATURE: 99 F | OXYGEN SATURATION: 98 % | SYSTOLIC BLOOD PRESSURE: 161 MMHG | DIASTOLIC BLOOD PRESSURE: 83 MMHG

## 2023-08-21 DIAGNOSIS — S61.412A LACERATION OF LEFT HAND WITHOUT FOREIGN BODY, INITIAL ENCOUNTER: ICD-10-CM

## 2023-08-21 DIAGNOSIS — S60.222A CONTUSION OF LEFT HAND, INITIAL ENCOUNTER: Primary | ICD-10-CM

## 2023-08-21 DIAGNOSIS — Z79.01 ADEQUATE ANTICOAGULATION ON ANTICOAGULANT THERAPY: ICD-10-CM

## 2023-08-21 PROCEDURE — 12002 RPR S/N/AX/GEN/TRNK2.6-7.5CM: CPT

## 2023-08-21 PROCEDURE — 90471 IMMUNIZATION ADMIN: CPT | Performed by: EMERGENCY MEDICINE

## 2023-08-21 PROCEDURE — 25000003 PHARM REV CODE 250: Performed by: EMERGENCY MEDICINE

## 2023-08-21 PROCEDURE — 12002 RPR S/N/AX/GEN/TRNK2.6-7.5CM: CPT | Performed by: EMERGENCY MEDICINE

## 2023-08-21 PROCEDURE — 90715 TDAP VACCINE 7 YRS/> IM: CPT | Performed by: EMERGENCY MEDICINE

## 2023-08-21 PROCEDURE — 99284 EMERGENCY DEPT VISIT MOD MDM: CPT | Mod: 25 | Performed by: EMERGENCY MEDICINE

## 2023-08-21 PROCEDURE — 63600175 PHARM REV CODE 636 W HCPCS: Performed by: EMERGENCY MEDICINE

## 2023-08-21 PROCEDURE — 99284 EMERGENCY DEPT VISIT MOD MDM: CPT

## 2023-08-21 RX ORDER — CEPHALEXIN 250 MG/1
500 CAPSULE ORAL
Status: COMPLETED | OUTPATIENT
Start: 2023-08-21 | End: 2023-08-21

## 2023-08-21 RX ORDER — LIDOCAINE HYDROCHLORIDE 10 MG/ML
10 INJECTION INFILTRATION; PERINEURAL
Status: COMPLETED | OUTPATIENT
Start: 2023-08-21 | End: 2023-08-21

## 2023-08-21 RX ORDER — CYANOCOBALAMIN 1000 UG/ML
INJECTION, SOLUTION INTRAMUSCULAR; SUBCUTANEOUS
COMMUNITY
Start: 2023-07-14 | End: 2023-11-14

## 2023-08-21 RX ORDER — CEPHALEXIN 500 MG/1
500 CAPSULE ORAL 4 TIMES DAILY
Qty: 28 CAPSULE | Refills: 0 | Status: SHIPPED | OUTPATIENT
Start: 2023-08-21 | End: 2023-08-28

## 2023-08-21 RX ADMIN — TETANUS TOXOID, REDUCED DIPHTHERIA TOXOID AND ACELLULAR PERTUSSIS VACCINE, ADSORBED 0.5 ML: 5; 2.5; 8; 8; 2.5 SUSPENSION INTRAMUSCULAR at 04:08

## 2023-08-21 RX ADMIN — CEPHALEXIN 500 MG: 250 CAPSULE ORAL at 06:08

## 2023-08-21 RX ADMIN — NEOMYCIN, POLYMIXIN, BACITRACIN: 5; 400; 5000 OINTMENT TOPICAL at 05:08

## 2023-08-21 RX ADMIN — LIDOCAINE HYDROCHLORIDE 10 ML: 10 INJECTION, SOLUTION INFILTRATION; PERINEURAL at 04:08

## 2023-08-21 NOTE — ED PROVIDER NOTES
Encounter Date: 8/21/2023       History     Chief Complaint   Patient presents with    Hand Injury     PT SUSTAINED LACERATION OF THE DISTAL L 3/4 FINGERS AS HE SLAMMED IN CAR DOOR; PT DENIES PARESTHESIAS OR DECREASED ROM.  NOT UTD ON TET IMM  PT IS ON PLAVIX       The history is provided by the patient and the spouse. History limited by: AGED. No  was used.     Review of patient's allergies indicates:   Allergen Reactions    Gadolinium-containing contrast media      Past Medical History:   Diagnosis Date    Hypertension     Hypothyroidism, unspecified     Stroke      Past Surgical History:   Procedure Laterality Date    BACK SURGERY       No family history on file.  Social History     Tobacco Use    Smoking status: Never    Smokeless tobacco: Former     Types: Chew   Substance Use Topics    Alcohol use: Never    Drug use: Never     Review of Systems   Constitutional: Negative.    HENT: Negative.     Eyes: Negative.    Respiratory: Negative.     Cardiovascular: Negative.    Gastrointestinal: Negative.    Endocrine: Negative.    Genitourinary: Negative.    Musculoskeletal:  Positive for arthralgias (R 3.4 FINGER).   Skin:  Positive for wound.   Allergic/Immunologic: Negative.    Neurological: Negative.    Hematological:  Bruises/bleeds easily.       Physical Exam     Initial Vitals [08/21/23 1549]   BP Pulse Resp Temp SpO2   (!) 161/83 81 18 98.7 °F (37.1 °C) 98 %      MAP       --         Physical Exam    Nursing note and vitals reviewed.  Constitutional: He appears well-developed and well-nourished. He is cooperative. He appears distressed.   HENT:   Head: Normocephalic and atraumatic.   Right Ear: External ear normal.   Left Ear: External ear normal.   Nose: Nose normal.   Mouth/Throat: Oropharynx is clear and moist. No oropharyngeal exudate.   Eyes: Conjunctivae and EOM are normal. Pupils are equal, round, and reactive to light.   Neck: Trachea normal. Neck supple.   Normal range of  motion.  Cardiovascular:  Normal rate, regular rhythm, normal heart sounds and intact distal pulses.           Pulses:       Radial pulses are 3+ on the right side and 3+ on the left side.        Dorsalis pedis pulses are 3+ on the right side and 3+ on the left side.   Pulmonary/Chest: Breath sounds normal. No respiratory distress. He has no wheezes.   Abdominal: Abdomen is soft. Bowel sounds are normal. He exhibits no distension.   Musculoskeletal:         General: Tenderness (DISTAL L 3,4 DIGITS) present. Normal range of motion.      Right shoulder: Normal.      Left shoulder: Normal.      Right upper arm: Normal.      Left upper arm: Normal.      Right elbow: Normal.      Left elbow: Normal.      Right forearm: Normal.      Left forearm: Normal.      Right wrist: Normal.      Left wrist: Normal.      Right hand: Normal.      Left hand: Normal.      Cervical back: Normal range of motion and neck supple.     Lymphadenopathy:     He has no cervical adenopathy.     He has no axillary adenopathy.   Neurological: He is alert and oriented to person, place, and time. He has normal strength. No cranial nerve deficit or sensory deficit. He displays a negative Romberg sign. GCS score is 15. GCS eye subscore is 4. GCS verbal subscore is 5. GCS motor subscore is 6.   Skin: Skin is warm and dry. Capillary refill takes less than 2 seconds. No rash noted. No erythema.   LAC X 2 1.5 CM DISTAL L 3,4 FINGERS  NORMAL SENSATION   MILD BLEEDING  STOPPED WITH  GENTLE  PRESSURE   Psychiatric: He has a normal mood and affect. His speech is normal and behavior is normal. Judgment and thought content normal. Cognition and memory are normal.         Medical Screening Exam   See Full Note    ED Course   Lac Repair    Date/Time: 8/21/2023 5:00 PM    Performed by: Cierra Daniels MD  Authorized by: Cierra Daniels MD    Consent:     Consent obtained:  Verbal    Consent given by:  Patient and spouse    Risks, benefits, and alternatives  were discussed: yes      Risks discussed:  Infection and pain  Universal protocol:     Procedure explained and questions answered to patient or proxy's satisfaction: yes      Relevant documents present and verified: yes      Test results available: yes      Imaging studies available: yes      Required blood products, implants, devices, and special equipment available: yes      Site/side marked: yes      Immediately prior to procedure, a time out was called: yes      Patient identity confirmed:  Verbally with patient  Anesthesia:     Anesthesia method:  Local infiltration    Local anesthetic:  Lidocaine 1% WITH epi  Laceration details:     Location:  Hand    Length (cm):  3    Depth (mm):  3  Pre-procedure details:     Preparation:  Patient was prepped and draped in usual sterile fashion and imaging obtained to evaluate for foreign bodies  Exploration:     Limited defect created (wound extended): no      Hemostasis achieved with:  Direct pressure    Imaging obtained: x-ray      Imaging outcome: foreign body not noted      Wound exploration: wound explored through full range of motion and entire depth of wound visualized      Contaminated: no    Treatment:     Area cleansed with:  Povidone-iodine and saline    Amount of cleaning:  Standard    Irrigation solution:  Sterile saline    Irrigation method:  Syringe    Visualized foreign bodies/material removed: no      Debridement:  None    Undermining:  None    Scar revision: no    Skin repair:     Repair method:  Sutures    Suture size:  4-0    Suture material:  Nylon    Number of sutures:  9  Approximation:     Approximation:  Close  Repair type:     Repair type:  Simple  Post-procedure details:     Dressing:  Antibiotic ointment, non-adherent dressing, sterile dressing and bulky dressing    Procedure completion:  Tolerated well, no immediate complications  Comments:      TOLERATED WELL    Labs Reviewed - No data to display       Imaging Results              X-Ray Hand 3  view Left (Final result)  Result time 08/21/23 16:26:18      Final result by Arun Benito II, MD (08/21/23 16:26:18)                   Impression:      No definite acute injury.      Electronically signed by: Arun Benito  Date:    08/21/2023  Time:    16:26               Narrative:    EXAMINATION:  XR HAND COMPLETE 3 VIEW LEFT    CLINICAL HISTORY:  LACERATION;    COMPARISON:  None available    TECHNIQUE:  XR HAND 3 VIEW LEFT    FINDINGS:  No evidence of fracture seen.  The alignment of the joints appears normal.  No degenerative change is present.  Moderate to severe scaphoid carpal and 1st carpometacarpal joint degenerative changes.  Mild to moderate changes in the remaining wrist.  There is suggestion of calcification and or osseous debris in overlying the wrist.  No other soft tissue abnormality is seen.                                    X-Rays:   Independently Interpreted Readings:   Other Readings:  Updated   Order   08/21/23 1628  X-Ray Hand 3 view Left   Performed: 08/21/23 1611  Final         Impression:  No definite acute injury. Electronically signed by: Arun Benito Date: 08/21/2023 Time: 16:26           Medications   Tdap (BOOSTRIX) vaccine injection 0.5 mL (0.5 mLs Intramuscular Given 8/21/23 1624)   LIDOcaine HCL 10 mg/ml (1%) injection 10 mL (10 mLs Other Given 8/21/23 1649)   neomycin-bacitracnZn-polymyxnB packet ( Topical (Top) Given 8/21/23 1700)   cephALEXin capsule 500 mg (500 mg Oral Given 8/21/23 1801)     Medical Decision Making  PT SUSTAINED LACERATION OF THE DISTAL L 3/4 FINGERS AS HE SLAMMED IN CAR DOOR; PT DENIES PARESTHESIAS OR DECREASED ROM.  NOT UTD ON TET IMM  PT IS ON PLAVIX    EXAM   XRAY NEG  SUTURES  AND DC HOME IN STABLE CONDITION    Amount and/or Complexity of Data Reviewed  Independent Historian: spouse     Details: Updated   Order   08/21/23 1628  X-Ray Hand 3 view Left   Performed: 08/21/23 1611  Final         Impression:  No definite acute injury.  Electronically signed by: Arun Benito Date: 08/21/2023 Time: 16:26  External Data Reviewed: radiology.  Radiology: ordered. Decision-making details documented in ED Course.     Details: Updated   Order   08/21/23 1628  X-Ray Hand 3 view Left   Performed: 08/21/23 1611  Final         Impression:  No definite acute injury. Electronically signed by: Arun Benito Date: 08/21/2023 Time: 16:26    Risk  OTC drugs.  Prescription drug management.  Minor surgery with identified risk factors.  Risk Details: ON AC WITH PLAVIX                          Medical Decision Making:   Initial Assessment:   PT SUSTAINED LACERATION OF THE DISTAL L 3/4 FINGERS AS HE SLAMMED IN CAR DOOR; PT DENIES PARESTHESIAS OR DECREASED ROM.  NOT UTD ON TET IMM  PT IS ON PLAVIX     Differential Diagnosis:   FX,FB,CONTUSION, LACERATION  Clinical Tests:   Radiological Study: Ordered and Reviewed  ED Management:  EXAM  SUTURE  XRAY NEG  DC HOME  INCREASE REST AND FLUIDS  ELEVATE  WOUND CARE TWICE A DAY USE Q TIP TO CLEAN AND APPLY NEOSPORIN AND DRESSING  MEDICATIONS AS DIRECTED  BENADRYL FOR PAIN AS YOU CANNOT TAKE TYLENOL OR ADVIL/ALEVE  WOUND CHECK IN 2 DAYS , SUTURE REMOVAL IN 14 DAYS      Clinical Impression:   Final diagnoses:  [S60.222A] Contusion of left hand, initial encounter (Primary)  [S61.412A] Laceration of left hand without foreign body, initial encounter  [Z79.01] Adequate anticoagulation on anticoagulant therapy        ED Disposition Condition    Discharge           ED Prescriptions       Medication Sig Dispense Start Date End Date Auth. Provider    cephALEXin (KEFLEX) 500 MG capsule Take 1 capsule (500 mg total) by mouth 4 (four) times daily. for 7 days 28 capsule 8/21/2023 8/28/2023 Cierra Daniels MD          Follow-up Information    None          Cierra Daniels MD  08/21/23 1812       Cierra Daniels MD  08/21/23 2102       Cierra Daniels MD  08/21/23 2108

## 2023-08-21 NOTE — DISCHARGE INSTRUCTIONS
INCREASE REST AND FLUIDS  ELEVATE  WOUND CARE TWICE A DAY USE Q TIP TO CLEAN AND APPLY NEOSPORIN AND DRESSING  MEDICATIONS AS DIRECTED  BENADRYL FOR PAIN AS YOU CANNOT TAKE TYLENOL OR ADVIL/ALEVE  WOUND CHECK IN 2 DAYS , SUTURE REMOVAL IN 14 DAYS  AVOID GETTING HOT OUTSIDE

## 2023-09-05 ENCOUNTER — CLINICAL SUPPORT (OUTPATIENT)
Dept: FAMILY MEDICINE | Facility: CLINIC | Age: 88
End: 2023-09-05
Payer: MEDICARE

## 2023-09-05 DIAGNOSIS — Z48.02 VISIT FOR SUTURE REMOVAL: Primary | ICD-10-CM

## 2023-09-05 NOTE — PROGRESS NOTES
Nurse visit only. Sutures removed from fingers as requested via pt. Left middle and ring finger tips; 4 stitches in middle tip and 5 in ring finger tip. Area surrounding stitches slightly pink in color, no bleeding noted after removal of stitches. Pt tolerated well

## 2023-11-14 ENCOUNTER — OFFICE VISIT (OUTPATIENT)
Dept: FAMILY MEDICINE | Facility: CLINIC | Age: 88
End: 2023-11-14
Payer: MEDICARE

## 2023-11-14 VITALS
WEIGHT: 117 LBS | DIASTOLIC BLOOD PRESSURE: 68 MMHG | BODY MASS INDEX: 18.8 KG/M2 | RESPIRATION RATE: 18 BRPM | OXYGEN SATURATION: 100 % | SYSTOLIC BLOOD PRESSURE: 134 MMHG | TEMPERATURE: 98 F | HEART RATE: 67 BPM | HEIGHT: 66 IN

## 2023-11-14 DIAGNOSIS — R05.1 ACUTE COUGH: ICD-10-CM

## 2023-11-14 DIAGNOSIS — E03.9 ACQUIRED HYPOTHYROIDISM: Primary | ICD-10-CM

## 2023-11-14 LAB
ALBUMIN SERPL BCP-MCNC: 3.4 G/DL (ref 3.5–5)
ALBUMIN/GLOB SERPL: 0.9 {RATIO}
ALP SERPL-CCNC: 115 U/L (ref 45–115)
ALT SERPL W P-5'-P-CCNC: 30 U/L (ref 16–61)
ANION GAP SERPL CALCULATED.3IONS-SCNC: 11 MMOL/L (ref 7–16)
AST SERPL W P-5'-P-CCNC: 30 U/L (ref 15–37)
BILIRUB SERPL-MCNC: 0.3 MG/DL (ref ?–1.2)
BUN SERPL-MCNC: 20 MG/DL (ref 7–18)
BUN/CREAT SERPL: 16 (ref 6–20)
CALCIUM SERPL-MCNC: 9.2 MG/DL (ref 8.5–10.1)
CHLORIDE SERPL-SCNC: 107 MMOL/L (ref 98–107)
CO2 SERPL-SCNC: 27 MMOL/L (ref 21–32)
CREAT SERPL-MCNC: 1.29 MG/DL (ref 0.7–1.3)
EGFR (NO RACE VARIABLE) (RUSH/TITUS): 53 ML/MIN/1.73M2
GLOBULIN SER-MCNC: 4 G/DL (ref 2–4)
GLUCOSE SERPL-MCNC: 96 MG/DL (ref 74–106)
POTASSIUM SERPL-SCNC: 4.1 MMOL/L (ref 3.5–5.1)
PROT SERPL-MCNC: 7.4 G/DL (ref 6.4–8.2)
SODIUM SERPL-SCNC: 141 MMOL/L (ref 136–145)
TSH SERPL DL<=0.005 MIU/L-ACNC: 3.69 UIU/ML (ref 0.36–3.74)

## 2023-11-14 PROCEDURE — 96372 THER/PROPH/DIAG INJ SC/IM: CPT | Mod: ,,, | Performed by: NURSE PRACTITIONER

## 2023-11-14 PROCEDURE — 3288F FALL RISK ASSESSMENT DOCD: CPT | Mod: ,,, | Performed by: NURSE PRACTITIONER

## 2023-11-14 PROCEDURE — 1159F MED LIST DOCD IN RCRD: CPT | Mod: ,,, | Performed by: NURSE PRACTITIONER

## 2023-11-14 PROCEDURE — 1160F RVW MEDS BY RX/DR IN RCRD: CPT | Mod: ,,, | Performed by: NURSE PRACTITIONER

## 2023-11-14 PROCEDURE — 1126F PR PAIN SEVERITY QUANTIFIED, NO PAIN PRESENT: ICD-10-PCS | Mod: ,,, | Performed by: NURSE PRACTITIONER

## 2023-11-14 PROCEDURE — 3288F PR FALLS RISK ASSESSMENT DOCUMENTED: ICD-10-PCS | Mod: ,,, | Performed by: NURSE PRACTITIONER

## 2023-11-14 PROCEDURE — 1160F PR REVIEW ALL MEDS BY PRESCRIBER/CLIN PHARMACIST DOCUMENTED: ICD-10-PCS | Mod: ,,, | Performed by: NURSE PRACTITIONER

## 2023-11-14 PROCEDURE — 1159F PR MEDICATION LIST DOCUMENTED IN MEDICAL RECORD: ICD-10-PCS | Mod: ,,, | Performed by: NURSE PRACTITIONER

## 2023-11-14 PROCEDURE — 99213 PR OFFICE/OUTPT VISIT, EST, LEVL III, 20-29 MIN: ICD-10-PCS | Mod: 25,,, | Performed by: NURSE PRACTITIONER

## 2023-11-14 PROCEDURE — 99213 OFFICE O/P EST LOW 20 MIN: CPT | Mod: 25,,, | Performed by: NURSE PRACTITIONER

## 2023-11-14 PROCEDURE — 96372 PR INJECTION,THERAP/PROPH/DIAG2ST, IM OR SUBCUT: ICD-10-PCS | Mod: ,,, | Performed by: NURSE PRACTITIONER

## 2023-11-14 PROCEDURE — 1126F AMNT PAIN NOTED NONE PRSNT: CPT | Mod: ,,, | Performed by: NURSE PRACTITIONER

## 2023-11-14 PROCEDURE — 1101F PT FALLS ASSESS-DOCD LE1/YR: CPT | Mod: ,,, | Performed by: NURSE PRACTITIONER

## 2023-11-14 PROCEDURE — 80053 COMPREHEN METABOLIC PANEL: CPT | Mod: ,,, | Performed by: CLINICAL MEDICAL LABORATORY

## 2023-11-14 PROCEDURE — 80053 COMPREHENSIVE METABOLIC PANEL: ICD-10-PCS | Mod: ,,, | Performed by: CLINICAL MEDICAL LABORATORY

## 2023-11-14 PROCEDURE — 84443 TSH: ICD-10-PCS | Mod: ,,, | Performed by: CLINICAL MEDICAL LABORATORY

## 2023-11-14 PROCEDURE — 1101F PR PT FALLS ASSESS DOC 0-1 FALLS W/OUT INJ PAST YR: ICD-10-PCS | Mod: ,,, | Performed by: NURSE PRACTITIONER

## 2023-11-14 PROCEDURE — 84443 ASSAY THYROID STIM HORMONE: CPT | Mod: ,,, | Performed by: CLINICAL MEDICAL LABORATORY

## 2023-11-14 RX ORDER — DEXAMETHASONE SODIUM PHOSPHATE 4 MG/ML
4 INJECTION, SOLUTION INTRA-ARTICULAR; INTRALESIONAL; INTRAMUSCULAR; INTRAVENOUS; SOFT TISSUE
Status: COMPLETED | OUTPATIENT
Start: 2023-11-14 | End: 2023-11-14

## 2023-11-14 RX ADMIN — DEXAMETHASONE SODIUM PHOSPHATE 4 MG: 4 INJECTION, SOLUTION INTRA-ARTICULAR; INTRALESIONAL; INTRAMUSCULAR; INTRAVENOUS; SOFT TISSUE at 11:11

## 2023-11-14 NOTE — PROGRESS NOTES
[facility]    Chief Complaint      Chief Complaint   Patient presents with    Follow-up     4 month follow up. Pt is not fasting    Health Maintenance     Pt defers care gaps       History of Present Illness      Gregory Plasencia is a 89 y.o. male. He  has a past medical history of Hypertension, Hypothyroidism, unspecified, and Stroke., who presents today for f/u and labwork.  He is also complaining of a cough- mainly at night for a few days.     Past Medical History:  Past Medical History:   Diagnosis Date    Hypertension     Hypothyroidism, unspecified     Stroke        Past Surgical History:   has a past surgical history that includes Back surgery.    Social History:  Social History     Tobacco Use    Smoking status: Never    Smokeless tobacco: Former     Types: Chew   Substance Use Topics    Alcohol use: Never    Drug use: Never       I personally reviewed all past medical, surgical, and social.     Review of Systems   Constitutional:  Negative for fatigue and unexpected weight change.   HENT:  Negative for sore throat.    Eyes:  Negative for visual disturbance.   Respiratory:  Positive for cough. Negative for shortness of breath and wheezing.    Cardiovascular: Negative.    Gastrointestinal:  Negative for abdominal pain, constipation, diarrhea, nausea and vomiting.   Genitourinary:  Negative for difficulty urinating.   Musculoskeletal:  Negative for gait problem.   Skin: Negative.    Neurological:  Negative for dizziness and light-headedness.        Medications:  Outpatient Encounter Medications as of 11/14/2023   Medication Sig Dispense Refill    albuterol (PROVENTIL/VENTOLIN HFA) 90 mcg/actuation inhaler Inhale 2 puffs into the lungs every 4 (four) hours as needed.      amLODIPine (NORVASC) 5 MG tablet Take 1 tablet (5 mg total) by mouth once daily. 90 tablet 1    atorvastatin (LIPITOR) 20 MG tablet Take 1 tablet (20 mg total) by mouth every evening. 90 tablet 1    clopidogreL (PLAVIX) 75 mg tablet  "Take 1 tablet (75 mg total) by mouth once daily. 90 tablet 1    gabapentin (NEURONTIN) 100 MG capsule Take 1 capsule (100 mg total) by mouth every evening. 90 capsule 1    lisinopriL (PRINIVIL,ZESTRIL) 20 MG tablet Take 1 tablet (20 mg total) by mouth once daily. 90 tablet 1    rOPINIRole (REQUIP) 0.5 MG tablet Take 1 tab by mouth at bedtime for restless legs. 90 tablet 1    levothyroxine (SYNTHROID) 88 MCG tablet Take 1 tablet (88 mcg total) by mouth before breakfast. 90 tablet 1    [DISCONTINUED] cyanocobalamin 1,000 mcg/mL injection INJECT 1mL AS DIRECTED every 14 days      [DISCONTINUED] cyanocobalamin, vitamin B-12, 1,000 mcg/mL Kit Inject 1 each as directed every 14 (fourteen) days. (Patient not taking: Reported on 11/14/2023) 2 kit 2    [DISCONTINUED] predniSONE (DELTASONE) 20 MG tablet Take 1 tablet (20 mg total) by mouth once daily. (Patient not taking: Reported on 11/14/2023) 5 tablet 0     Facility-Administered Encounter Medications as of 11/14/2023   Medication Dose Route Frequency Provider Last Rate Last Admin    dexAMETHasone injection 4 mg  4 mg Intramuscular 1 time in Clinic/HOD Aggie Cruz FNP           Allergies:  Review of patient's allergies indicates:   Allergen Reactions    Gadolinium-containing contrast media        Health Maintenance:  Immunization History   Administered Date(s) Administered    COVID-19, MRNA, LN-S, PF (MODERNA FULL 0.5 ML DOSE) 02/11/2021, 03/11/2021, 11/04/2021    COVID-19, mRNA, LNP-S, bivalent booster, PF (Moderna Omicron)12 + YEARS 10/31/2022    Tdap 08/21/2023      Health Maintenance   Topic Date Due    Shingles Vaccine (1 of 2) Never done    Lipid Panel  02/08/2028    TETANUS VACCINE  08/21/2033        Physical Exam      Vital Signs  Temp: 98.2 °F (36.8 °C)  Temp Source: Oral  Pulse: 67  Resp: 18  SpO2: 100 %  BP: 134/68  BP Location: Right arm  Patient Position: Sitting  Pain Score: 0-No pain  Height and Weight  Height: 5' 6" (167.6 cm)  Weight: 53.1 kg (117 " lb)  BSA (Calculated - sq m): 1.57 sq meters  BMI (Calculated): 18.9  Weight in (lb) to have BMI = 25: 154.6]    Physical Exam  Vitals and nursing note reviewed.   Constitutional:       Appearance: Normal appearance.   HENT:      Head: Normocephalic.      Right Ear: Hearing normal.      Left Ear: Hearing normal.      Nose: Nose normal.   Eyes:      General: Lids are normal.      Conjunctiva/sclera: Conjunctivae normal.   Neck:      Thyroid: No thyromegaly.   Cardiovascular:      Rate and Rhythm: Normal rate and regular rhythm.      Heart sounds: Normal heart sounds.   Pulmonary:      Effort: Pulmonary effort is normal.      Breath sounds: Normal breath sounds.   Musculoskeletal:         General: Normal range of motion.      Cervical back: Normal range of motion and neck supple.   Skin:     General: Skin is warm and dry.   Neurological:      General: No focal deficit present.      Mental Status: He is alert and oriented to person, place, and time.      Gait: Gait is intact.          Laboratory:  CBC:  Recent Labs   Lab 11/10/22  0857 02/08/23  0853 03/14/23  1600   WBC 6.95 7.18 8.46   RBC 4.58 L 4.30 L 4.70   Hemoglobin 12.7 L 12.0 L 12.9 L   Hematocrit 41.2 37.9 L 41.2   Platelet Count 259 250 319   MCV 90.0 88.1 87.7   MCH 27.7 27.9 27.4   MCHC 30.8 L 31.7 L 31.3 L     CMP:  Recent Labs   Lab 11/10/22  0857 02/08/23  0853 03/14/23  1600   Glucose 89 96 96   Calcium 9.4 9.0 9.6   Albumin 4.1 3.3 L 3.9   Total Protein 7.7 6.8 8.6 H   Sodium 139 144 136   Potassium 3.8 3.4 L 4.4   CO2 24 28 27   Chloride 106 108 H 105   BUN 17 9 27 H   Alk Phos 113 130 H 136 H   ALT 29 23 35   AST 40 H 30 42 H   Bilirubin, Total 1.0 0.7 0.5     LIPIDS:  Recent Labs   Lab 06/08/21  0911 11/10/22  0857 02/08/23  0853 03/14/23  1600   TSH  --  0.048 L 0.018 L 4.470 H   HDL Cholesterol 56 54 47  --    Cholesterol 120 112 106  --    Triglycerides 109 71 90  --    LDL Calculated 42 44 41  --    Cholesterol/HDL Ratio (Risk Factor) 2.1 2.1  2.3  --    Non-HDL 64 58 59  --      TSH:  Recent Labs   Lab 11/10/22  0857 02/08/23  0853 03/14/23  1600   TSH 0.048 L 0.018 L 4.470 H     A1C:        Assessment/Plan     Gregory Plasencia is a 89 y.o.male with:     1. Acquired hypothyroidism  -     Comprehensive Metabolic Panel; Future; Expected date: 11/14/2023  -     TSH; Future; Expected date: 11/14/2023    2. Acute cough  -     dexAMETHasone injection 4 mg       Patient does not need any refills today; have reviewed all them in clinic today    Total time spent face-to-face and non-face-to-face coordinating care for this encounter was: 20 minutes     Chronic conditions status updated as per HPI.  Other than changes above, cont current medications and maintain follow up with specialists.  Return to clinic in 3 mo or prn if symptoms worsen or fail to improve.    Aggie Cruz, FNP  [facility]

## 2023-11-15 ENCOUNTER — PATIENT OUTREACH (OUTPATIENT)
Dept: ADMINISTRATIVE | Facility: HOSPITAL | Age: 88
End: 2023-11-15

## 2023-11-15 NOTE — PROGRESS NOTES
Health maintenance record review for population health care gaps    Population Health Chart Review & Patient Outreach Details      Further Action Needed If Patient Returns Outreach:            Updates Requested / Reviewed:     [x]  Care Everywhere    [x]     []  External Sources ([facility], [facility], [facility], etc.)    [] [facility]   [] [facility]   [] Other:    [x]  Care Team Updated   []  Removed  or Duplicate Orders   [x]  Immunization Reconciliation Completed / Queried    [] [place]   [x] [place]   [] [place]   [] [place]      Health Maintenance Topics Addressed and Outreach Outcomes / Actions Taken:             Breast Cancer Screening []  Mammogram Order Placed    []  Mammogram Screening Scheduled    []  External Records Requested & Care Team Updated if Applicable    []  External Records Uploaded & Care Team Updated if Applicable    []  Pt Declined Scheduling Mammogram    []  Pt Will Schedule with External Provider / Order Routed & Care Team Updated if Applicable              Cervical Cancer Screening []  Pap Smear Scheduled in Primary Care or OBGYN    []  External Records Requested & Care Team Updated if Applicable       []  External Records Uploaded, Care Team Updated, & History Updated if Applicable    []  Patient Declined Scheduling Pap Smear    []  Patient Will Schedule with External Provider & Care Team Updated if Applicable                  Colorectal Cancer Screening []  Colonoscopy Case Request / Referral / Home Test Order Placed    []  External Records Requested & Care Team Updated if Applicable    []  External Records Uploaded, Care Team Updated, & History Updated if Applicable    []  Patient Declined Completing Colon Cancer Screening    []  Patient Will Schedule with External Provider & Care Team Updated if Applicable    []  Fit Kit Mailed (add the SmartPhrase under additional notes)    []  Reminded Patient to Complete Home Test                Diabetic Eye Exam []  Eye Exam Screening Order  Placed    []  Eye Camera Scheduled or Optometry/Ophthalmology Referral Placed    []  External Records Requested & Care Team Updated if Applicable    []  External Records Uploaded, Care Team Updated, & History Updated if Applicable    []  Patient Declined Scheduling Eye Exam    []  Patient Will Schedule with External Provider & Care Team Updated if Applicable             Blood Pressure Control []  Primary Care Follow Up Visit Scheduled     []  Remote Blood Pressure Reading Captured    []  Patient Declined Remote Reading or Scheduling Appt - Escalated to PCP    []  Patient Will Call Back or Send Portal Message with Reading                 HbA1c & Other Labs []  Overdue Lab(s) Ordered    []  Overdue Lab(s) Scheduled    []  External Records Uploaded & Care Team Updated if Applicable    []  Primary Care Follow Up Visit Scheduled     []  Reminded Patient to Complete A1c Home Test    []  Patient Declined Scheduling Labs or Will Call Back to Schedule    []  Patient Will Schedule with External Provider / Order Routed, & Care Team Updated if Applicable           Primary Care Appointment []  Primary Care Appt Scheduled    []  Patient Declined Scheduling or Will Call Back to Schedule    []  Pt Established with External Provider, Updated Care Team, & Informed Pt to Notify Payor if Applicable           Medication Adherence /    Statin Use []  Primary Care Appointment Scheduled    []  Patient Reminded to  Prescription    []  Patient Declined, Provider Notified if Needed    []  Sent Provider Message to Review to Evaluate Pt for Statin, Add Exclusion Dx Codes, Document   Exclusion in Problem List, Change Statin Intensity Level to Moderate or High Intensity if Applicable                Osteoporosis Screening []  Dexa Order Placed    []  Dexa Appointment Scheduled    []  External Records Requested & Care Team Updated    []  External Records Uploaded, Care Team Updated, & History Updated if Applicable    []  Patient Declined  Scheduling Dexa or Will Call Back to Schedule    []  Patient Will Schedule with External Provider / Order Routed & Care Team Updated if Applicable       Additional Notes:

## 2023-11-15 NOTE — LETTER
AUTHORIZATION FOR RELEASE OF   CONFIDENTIAL INFORMATION    Dear [facility],    We are seeing Gregory Plasencia, date of birth 1934, in the clinic at [facility]. Aggie Cruz FNP is the patient's PCP. Gregory Plasencia has an outstanding lab/procedure at the time we reviewed his chart. In order to help keep his health information updated, he has authorized us to request the following medical record(s):        (  )  MAMMOGRAM                                      (  )  COLONOSCOPY      (  )  PAP SMEAR                                          (  )  OUTSIDE LAB RESULTS     (  )  DEXA SCAN                                          (  )  EYE EXAM            (  )  FOOT EXAM                                          (  )  ENTIRE RECORD     x  )  OUTSIDE IMMUNIZATIONS                 (  )  _______________         Please fax records to [facility], Thames, Kristen M, FNP, [phone]     If you have any questions, please contact Beti Pearson at [phone].           Patient Name: Gregoyr Plasencia  : 1934  Patient Phone #: [phone]

## 2023-11-27 ENCOUNTER — TELEPHONE (OUTPATIENT)
Dept: FAMILY MEDICINE | Facility: CLINIC | Age: 88
End: 2023-11-27
Payer: MEDICARE

## 2023-11-27 NOTE — TELEPHONE ENCOUNTER
----- Message from ANDREI Albright sent at 11/24/2023  9:02 AM CST -----  Labs are stable- need to continue to monitor his GFR-(kidney filtration)

## 2023-12-14 DIAGNOSIS — Z86.73 H/O: CVA (CEREBROVASCULAR ACCIDENT): ICD-10-CM

## 2023-12-14 DIAGNOSIS — G25.81 RESTLESS LEGS: ICD-10-CM

## 2023-12-14 DIAGNOSIS — E03.9 ACQUIRED HYPOTHYROIDISM: ICD-10-CM

## 2023-12-14 DIAGNOSIS — I10 ESSENTIAL HYPERTENSION: ICD-10-CM

## 2023-12-14 DIAGNOSIS — E78.5 DYSLIPIDEMIA: ICD-10-CM

## 2023-12-15 RX ORDER — ROPINIROLE 0.5 MG/1
TABLET, FILM COATED ORAL
Qty: 90 TABLET | Refills: 1 | Status: SHIPPED | OUTPATIENT
Start: 2023-12-15

## 2023-12-15 RX ORDER — CLOPIDOGREL BISULFATE 75 MG/1
75 TABLET ORAL DAILY
Qty: 90 TABLET | Refills: 1 | Status: SHIPPED | OUTPATIENT
Start: 2023-12-15

## 2023-12-15 RX ORDER — ATORVASTATIN CALCIUM 20 MG/1
20 TABLET, FILM COATED ORAL NIGHTLY
Qty: 90 TABLET | Refills: 1 | Status: SHIPPED | OUTPATIENT
Start: 2023-12-15

## 2023-12-15 RX ORDER — LEVOTHYROXINE SODIUM 88 UG/1
88 TABLET ORAL
Qty: 90 TABLET | Refills: 1 | Status: SHIPPED | OUTPATIENT
Start: 2023-12-15

## 2023-12-15 RX ORDER — GABAPENTIN 100 MG/1
100 CAPSULE ORAL NIGHTLY
Qty: 90 CAPSULE | Refills: 1 | Status: SHIPPED | OUTPATIENT
Start: 2023-12-15

## 2023-12-15 RX ORDER — LISINOPRIL 20 MG/1
20 TABLET ORAL DAILY
Qty: 90 TABLET | Refills: 1 | Status: SHIPPED | OUTPATIENT
Start: 2023-12-15

## 2024-01-09 DIAGNOSIS — I10 ESSENTIAL HYPERTENSION: ICD-10-CM

## 2024-01-10 RX ORDER — AMLODIPINE BESYLATE 5 MG/1
5 TABLET ORAL
Qty: 90 TABLET | Refills: 3 | Status: SHIPPED | OUTPATIENT
Start: 2024-01-10

## 2024-02-09 DIAGNOSIS — Z71.89 COMPLEX CARE COORDINATION: ICD-10-CM

## 2024-02-14 ENCOUNTER — OFFICE VISIT (OUTPATIENT)
Dept: FAMILY MEDICINE | Facility: CLINIC | Age: 89
End: 2024-02-14
Payer: MEDICARE

## 2024-02-14 VITALS
RESPIRATION RATE: 19 BRPM | WEIGHT: 116 LBS | OXYGEN SATURATION: 95 % | TEMPERATURE: 98 F | SYSTOLIC BLOOD PRESSURE: 136 MMHG | BODY MASS INDEX: 18.64 KG/M2 | HEIGHT: 66 IN | DIASTOLIC BLOOD PRESSURE: 84 MMHG | HEART RATE: 72 BPM

## 2024-02-14 DIAGNOSIS — J44.9 CHRONIC OBSTRUCTIVE PULMONARY DISEASE, UNSPECIFIED COPD TYPE: ICD-10-CM

## 2024-02-14 DIAGNOSIS — I10 ESSENTIAL HYPERTENSION: Primary | ICD-10-CM

## 2024-02-14 LAB
ANION GAP SERPL CALCULATED.3IONS-SCNC: 7 MMOL/L (ref 7–16)
BUN SERPL-MCNC: 20 MG/DL (ref 7–18)
BUN/CREAT SERPL: 19 (ref 6–20)
CALCIUM SERPL-MCNC: 9.9 MG/DL (ref 8.5–10.1)
CHLORIDE SERPL-SCNC: 107 MMOL/L (ref 98–107)
CO2 SERPL-SCNC: 29 MMOL/L (ref 21–32)
CREAT SERPL-MCNC: 1.08 MG/DL (ref 0.7–1.3)
EGFR (NO RACE VARIABLE) (RUSH/TITUS): 66 ML/MIN/1.73M2
GLUCOSE SERPL-MCNC: 91 MG/DL (ref 74–106)
POTASSIUM SERPL-SCNC: 4.3 MMOL/L (ref 3.5–5.1)
SODIUM SERPL-SCNC: 139 MMOL/L (ref 136–145)

## 2024-02-14 PROCEDURE — 80048 BASIC METABOLIC PNL TOTAL CA: CPT | Mod: ,,, | Performed by: CLINICAL MEDICAL LABORATORY

## 2024-02-14 PROCEDURE — 1159F MED LIST DOCD IN RCRD: CPT | Mod: ,,, | Performed by: NURSE PRACTITIONER

## 2024-02-14 PROCEDURE — 1126F AMNT PAIN NOTED NONE PRSNT: CPT | Mod: ,,, | Performed by: NURSE PRACTITIONER

## 2024-02-14 PROCEDURE — 99213 OFFICE O/P EST LOW 20 MIN: CPT | Mod: ,,, | Performed by: NURSE PRACTITIONER

## 2024-02-14 PROCEDURE — 1160F RVW MEDS BY RX/DR IN RCRD: CPT | Mod: ,,, | Performed by: NURSE PRACTITIONER

## 2024-02-14 PROCEDURE — 1101F PT FALLS ASSESS-DOCD LE1/YR: CPT | Mod: ,,, | Performed by: NURSE PRACTITIONER

## 2024-02-14 PROCEDURE — 3288F FALL RISK ASSESSMENT DOCD: CPT | Mod: ,,, | Performed by: NURSE PRACTITIONER

## 2024-02-14 NOTE — PROGRESS NOTES
[facility]    Chief Complaint      Chief Complaint   Patient presents with    Follow-up     3 month follow up. Pt is not fasting       History of Present Illness      Gregory Plasencia is a 89 y.o. male. He  has a past medical history of Hypertension, Hypothyroidism, unspecified, and Stroke., who presents today for 3 month follow up visit for his HTN and Hypothyroid.  TSH in Nov was WNL.  He denies any complaints today.     Past Medical History:  Past Medical History:   Diagnosis Date    Hypertension     Hypothyroidism, unspecified     Stroke        Past Surgical History:   has a past surgical history that includes Back surgery.    Social History:  Social History     Tobacco Use    Smoking status: Never    Smokeless tobacco: Former     Types: Chew   Substance Use Topics    Alcohol use: Never    Drug use: Never       I personally reviewed all past medical, surgical, and social.     Review of Systems   Constitutional:  Negative for fatigue and unexpected weight change.   HENT:  Negative for trouble swallowing.    Eyes:  Negative for visual disturbance.   Respiratory:  Negative for shortness of breath.    Cardiovascular: Negative.    Gastrointestinal:  Negative for abdominal pain, constipation, diarrhea, nausea and vomiting.   Genitourinary:  Negative for difficulty urinating.   Musculoskeletal:  Positive for gait problem.   Skin: Negative.    Neurological:  Negative for dizziness, light-headedness and headaches.        Medications:  Outpatient Encounter Medications as of 2/14/2024   Medication Sig Dispense Refill    albuterol (PROVENTIL/VENTOLIN HFA) 90 mcg/actuation inhaler Inhale 2 puffs into the lungs every 4 (four) hours as needed.      amLODIPine (NORVASC) 5 MG tablet TAKE 1 TABLET ONE TIME DAILY 90 tablet 3    atorvastatin (LIPITOR) 20 MG tablet Take 1 tablet (20 mg total) by mouth every evening. 90 tablet 1    clopidogreL (PLAVIX) 75 mg tablet Take 1 tablet (75 mg total) by mouth once daily. 90 tablet 1  "   gabapentin (NEURONTIN) 100 MG capsule Take 1 capsule (100 mg total) by mouth every evening. 90 capsule 1    lisinopriL (PRINIVIL,ZESTRIL) 20 MG tablet Take 1 tablet (20 mg total) by mouth once daily. 90 tablet 1    rOPINIRole (REQUIP) 0.5 MG tablet Take 1 tab by mouth at bedtime for restless legs. 90 tablet 1    levothyroxine (SYNTHROID) 88 MCG tablet Take 1 tablet (88 mcg total) by mouth before breakfast. 90 tablet 1     No facility-administered encounter medications on file as of 2/14/2024.       Allergies:  Review of patient's allergies indicates:   Allergen Reactions    Gadolinium-containing contrast media        Health Maintenance:  Immunization History   Administered Date(s) Administered    COVID-19, MRNA, LN-S, PF (MODERNA FULL 0.5 ML DOSE) 02/11/2021, 03/11/2021, 11/04/2021    COVID-19, mRNA, LNP-S, bivalent booster, PF (Moderna Omicron)12 + YEARS 10/31/2022    Tdap 08/21/2023      Health Maintenance   Topic Date Due    Shingles Vaccine (1 of 2) Never done    Lipid Panel  02/08/2028    TETANUS VACCINE  08/21/2033        Physical Exam      Vital Signs  Temp: 98.3 °F (36.8 °C)  Temp Source: Oral  Pulse: 72  Resp: 19  SpO2: 95 %  BP: 136/84  BP Location: Left arm  Patient Position: Sitting  Pain Score: 0-No pain  Height and Weight  Height: 5' 6" (167.6 cm)  Weight: 52.6 kg (116 lb)  BSA (Calculated - sq m): 1.57 sq meters  BMI (Calculated): 18.7  Weight in (lb) to have BMI = 25: 154.6]    Physical Exam  Vitals reviewed.   Constitutional:       Appearance: Normal appearance.   HENT:      Head: Normocephalic.      Right Ear: Decreased hearing noted.      Left Ear: Decreased hearing noted.      Nose: Nose normal.   Eyes:      General: Lids are normal.      Conjunctiva/sclera: Conjunctivae normal.   Neck:      Thyroid: No thyromegaly.   Cardiovascular:      Rate and Rhythm: Normal rate and regular rhythm.      Heart sounds: Normal heart sounds.   Pulmonary:      Effort: Pulmonary effort is normal.      Breath " sounds: Normal breath sounds.   Musculoskeletal:         General: Normal range of motion.      Cervical back: Normal range of motion and neck supple.      Right lower leg: No edema.      Left lower leg: No edema.      Comments: Ambulates with cane   Skin:     General: Skin is warm and dry.   Neurological:      General: No focal deficit present.      Mental Status: He is alert and oriented to person, place, and time.      Gait: Gait is intact.          Laboratory:  CBC:  Recent Labs   Lab 11/10/22  0857 02/08/23  0853 03/14/23  1600   WBC 6.95 7.18 8.46   RBC 4.58 L 4.30 L 4.70   Hemoglobin 12.7 L 12.0 L 12.9 L   Hematocrit 41.2 37.9 L 41.2   Platelet Count 259 250 319   MCV 90.0 88.1 87.7   MCH 27.7 27.9 27.4   MCHC 30.8 L 31.7 L 31.3 L     CMP:  Recent Labs   Lab 02/08/23  0853 03/14/23  1600 11/14/23  1048 02/14/24  1014   Glucose 96 96 96 91   Calcium 9.0 9.6 9.2 9.9   Albumin 3.3 L 3.9 3.4 L  --    Total Protein 6.8 8.6 H 7.4  --    Sodium 144 136 141 139   Potassium 3.4 L 4.4 4.1 4.3   CO2 28 27 27 29   Chloride 108 H 105 107 107   BUN 9 27 H 20 H 20 H   Alk Phos 130 H 136 H 115  --    ALT 23 35 30  --    AST 30 42 H 30  --    Bilirubin, Total 0.7 0.5 0.3  --      LIPIDS:  Recent Labs   Lab 06/08/21  0911 11/10/22  0857 02/08/23  0853 03/14/23  1600 11/14/23  1048   TSH  --  0.048 L 0.018 L 4.470 H 3.690   HDL Cholesterol 56 54 47  --   --    Cholesterol 120 112 106  --   --    Triglycerides 109 71 90  --   --    LDL Calculated 42 44 41  --   --    Cholesterol/HDL Ratio (Risk Factor) 2.1 2.1 2.3  --   --    Non-HDL 64 58 59  --   --      TSH:  Recent Labs   Lab 02/08/23  0853 03/14/23  1600 11/14/23  1048   TSH 0.018 L 4.470 H 3.690     A1C:        Assessment/Plan     Gregory Plasencia is a 89 y.o.male with:     1. Essential hypertension  -     Basic Metabolic Panel; Future; Expected date: 02/14/2024    2. Chronic obstructive pulmonary disease, unspecified COPD type       Medications were recently sent in; no  refills needed today    Total time spent face-to-face and non-face-to-face coordinating care for this encounter was: 20 minutes     Chronic conditions status updated as per HPI.  Other than changes above, cont current medications and maintain follow up with specialists.  Return to clinic in 3 month(s).    Aggie Cruz, ANDREI  [facility]

## 2024-03-11 ENCOUNTER — TELEPHONE (OUTPATIENT)
Dept: FAMILY MEDICINE | Facility: CLINIC | Age: 89
End: 2024-03-11
Payer: MEDICARE

## 2024-03-11 NOTE — TELEPHONE ENCOUNTER
Nimo contacted the clinic today regarding quantaflo test which was completed today. She is a NP with [employer]. She reports reading was severe to LLE and moderate to RLE. Dr Berrios consulted. Contacted patient to notify of results and that Dr Berrios advised a follow up with Aggie next week, pt agreed, transferred to scheduling. Pt has a follow up on 3/19/24. Awaiting test results to be faxed to clinic per Nimo.

## 2024-03-19 ENCOUNTER — OFFICE VISIT (OUTPATIENT)
Dept: FAMILY MEDICINE | Facility: CLINIC | Age: 89
End: 2024-03-19
Payer: MEDICARE

## 2024-03-19 VITALS
RESPIRATION RATE: 18 BRPM | SYSTOLIC BLOOD PRESSURE: 126 MMHG | HEIGHT: 66 IN | TEMPERATURE: 98 F | OXYGEN SATURATION: 98 % | HEART RATE: 76 BPM | DIASTOLIC BLOOD PRESSURE: 84 MMHG | BODY MASS INDEX: 18.8 KG/M2 | WEIGHT: 117 LBS

## 2024-03-19 DIAGNOSIS — R60.0 BILATERAL LOWER EXTREMITY EDEMA: Primary | ICD-10-CM

## 2024-03-19 PROCEDURE — 3288F FALL RISK ASSESSMENT DOCD: CPT | Mod: ,,, | Performed by: NURSE PRACTITIONER

## 2024-03-19 PROCEDURE — 1160F RVW MEDS BY RX/DR IN RCRD: CPT | Mod: ,,, | Performed by: NURSE PRACTITIONER

## 2024-03-19 PROCEDURE — 99213 OFFICE O/P EST LOW 20 MIN: CPT | Mod: ,,, | Performed by: NURSE PRACTITIONER

## 2024-03-19 PROCEDURE — 1126F AMNT PAIN NOTED NONE PRSNT: CPT | Mod: ,,, | Performed by: NURSE PRACTITIONER

## 2024-03-19 PROCEDURE — 1101F PT FALLS ASSESS-DOCD LE1/YR: CPT | Mod: ,,, | Performed by: NURSE PRACTITIONER

## 2024-03-19 PROCEDURE — 1159F MED LIST DOCD IN RCRD: CPT | Mod: ,,, | Performed by: NURSE PRACTITIONER

## 2024-03-19 NOTE — Clinical Note
Pt states the will go to [facility] to have the  Pneumococcal, Shingles, RSV, and COVID vaccines

## 2024-03-19 NOTE — PROGRESS NOTES
"[facility] Medicine    Chief Complaint      Chief Complaint   Patient presents with    Hypertension     FU x 1 month , Pt reports that a nurse from [facility] came out and stated that his left leg/ foot needed an exam pt states the nurse said " foot is dead , needs to be looked at"     care gaps      Pt states the will go to [facility] to have the  Pneumococcal, Shingles, RSV, and COVID vaccines        History of Present Illness      Gregory Plasencia is a 89 y.o. male. He  has a past medical history of Hypertension, Hypothyroidism, unspecified, and Stroke., who presents today for f/u after a NP from [facility] came to his house and performed an evaluation- he has brought in a form indicating that he has moderate to severe PAD    Patient denies that he has any pain, heaviness, coolness, numbness to BLE.    Past Medical History:  Past Medical History:   Diagnosis Date    Hypertension     Hypothyroidism, unspecified     Stroke        Past Surgical History:   has a past surgical history that includes Back surgery.    Social History:  Social History     Tobacco Use    Smoking status: Never     Passive exposure: Never    Smokeless tobacco: Former     Types: Chew   Substance Use Topics    Alcohol use: Never    Drug use: Never       I personally reviewed all past medical, surgical, and social.     Review of Systems   Constitutional:  Negative for fatigue and unexpected weight change.   HENT:  Negative for trouble swallowing.    Eyes:  Negative for visual disturbance.   Respiratory:  Negative for shortness of breath.    Cardiovascular:  Positive for leg swelling. Negative for chest pain and palpitations.   Gastrointestinal:  Negative for abdominal pain and vomiting.   Genitourinary:  Negative for difficulty urinating.   Musculoskeletal:  Negative for gait problem.   Skin: Negative.    Neurological:  Negative for dizziness and headaches.        Medications:  Outpatient Encounter Medications as of 3/19/2024   Medication Sig Dispense " "Refill    albuterol (PROVENTIL/VENTOLIN HFA) 90 mcg/actuation inhaler Inhale 2 puffs into the lungs every 4 (four) hours as needed.      amLODIPine (NORVASC) 5 MG tablet TAKE 1 TABLET ONE TIME DAILY 90 tablet 3    atorvastatin (LIPITOR) 20 MG tablet Take 1 tablet (20 mg total) by mouth every evening. 90 tablet 1    clopidogreL (PLAVIX) 75 mg tablet Take 1 tablet (75 mg total) by mouth once daily. 90 tablet 1    gabapentin (NEURONTIN) 100 MG capsule Take 1 capsule (100 mg total) by mouth every evening. 90 capsule 1    levothyroxine (SYNTHROID) 88 MCG tablet Take 1 tablet (88 mcg total) by mouth before breakfast. 90 tablet 1    lisinopriL (PRINIVIL,ZESTRIL) 20 MG tablet Take 1 tablet (20 mg total) by mouth once daily. 90 tablet 1    rOPINIRole (REQUIP) 0.5 MG tablet Take 1 tab by mouth at bedtime for restless legs. 90 tablet 1     No facility-administered encounter medications on file as of 3/19/2024.       Allergies:  Review of patient's allergies indicates:   Allergen Reactions    Gadolinium-containing contrast media        Health Maintenance:  Immunization History   Administered Date(s) Administered    COVID-19, MRNA, LN-S, PF (MODERNA FULL 0.5 ML DOSE) 02/11/2021, 03/11/2021, 11/04/2021    COVID-19, mRNA, LNP-S, bivalent booster, PF (Moderna Omicron)12 + YEARS 10/31/2022    Tdap 08/21/2023      Health Maintenance   Topic Date Due    Shingles Vaccine (1 of 2) Never done    Lipid Panel  02/08/2028    TETANUS VACCINE  08/21/2033        Physical Exam      Vital Signs  Temp: 98.2 °F (36.8 °C)  Temp Source: Oral  Pulse: 76  Resp: 18  SpO2: 98 %  BP: 126/84  BP Location: Right arm  Patient Position: Sitting  Pain Score: 0-No pain  Height and Weight  Height: 5' 6" (167.6 cm)  Weight: 53.1 kg (117 lb)  BSA (Calculated - sq m): 1.57 sq meters  BMI (Calculated): 18.9  Weight in (lb) to have BMI = 25: 154.6]    Physical Exam  Vitals and nursing note reviewed.   Constitutional:       Appearance: Normal appearance.   HENT:      " Head: Normocephalic.      Right Ear: Hearing normal.      Left Ear: Hearing normal.      Nose: Nose normal.   Eyes:      General: Lids are normal.      Conjunctiva/sclera: Conjunctivae normal.   Neck:      Thyroid: No thyromegaly.   Cardiovascular:      Rate and Rhythm: Normal rate and regular rhythm.      Pulses:           Dorsalis pedis pulses are 1+ on the right side and 1+ on the left side.      Heart sounds: Normal heart sounds.   Pulmonary:      Effort: Pulmonary effort is normal.      Breath sounds: Normal breath sounds.   Musculoskeletal:         General: Normal range of motion.      Cervical back: Normal range of motion and neck supple.   Feet:      Right foot:      Skin integrity: Skin integrity normal.      Toenail Condition: Right toenails are normal.      Left foot:      Skin integrity: Skin integrity normal.      Toenail Condition: Left toenails are normal.   Skin:     General: Skin is warm and dry.      Findings: No rash.   Neurological:      General: No focal deficit present.      Mental Status: He is alert and oriented to person, place, and time.      Gait: Gait is intact.          Laboratory:  CBC:  Recent Labs   Lab 11/10/22  0857 02/08/23  0853 03/14/23  1600   WBC 6.95 7.18 8.46   RBC 4.58 L 4.30 L 4.70   Hemoglobin 12.7 L 12.0 L 12.9 L   Hematocrit 41.2 37.9 L 41.2   Platelet Count 259 250 319   MCV 90.0 88.1 87.7   MCH 27.7 27.9 27.4   MCHC 30.8 L 31.7 L 31.3 L     CMP:  Recent Labs   Lab 02/08/23  0853 03/14/23  1600 11/14/23  1048 02/14/24  1014   Glucose 96 96 96 91   Calcium 9.0 9.6 9.2 9.9   Albumin 3.3 L 3.9 3.4 L  --    Total Protein 6.8 8.6 H 7.4  --    Sodium 144 136 141 139   Potassium 3.4 L 4.4 4.1 4.3   CO2 28 27 27 29   Chloride 108 H 105 107 107   BUN 9 27 H 20 H 20 H   Alk Phos 130 H 136 H 115  --    ALT 23 35 30  --    AST 30 42 H 30  --    Bilirubin, Total 0.7 0.5 0.3  --      LIPIDS:  Recent Labs   Lab 06/08/21  0911 11/10/22  0857 02/08/23  0853 03/14/23  1600 11/14/23  1048    TSH  --  0.048 L 0.018 L 4.470 H 3.690   HDL Cholesterol 56 54 47  --   --    Cholesterol 120 112 106  --   --    Triglycerides 109 71 90  --   --    LDL Calculated 42 44 41  --   --    Cholesterol/HDL Ratio (Risk Factor) 2.1 2.1 2.3  --   --    Non-HDL 64 58 59  --   --      TSH:  Recent Labs   Lab 02/08/23  0853 03/14/23  1600 11/14/23  1048   TSH 0.018 L 4.470 H 3.690     A1C:        Assessment/Plan     Gregory Plasencia is a 89 y.o.male with:     1. Bilateral lower extremity edema       Patient has some mild edema in BLE but the skin is warm and dry- he has palpable DP pulses- denies any symptoms of PAD.  I do not find any reason patient needs any further work up today.     Total time spent face-to-face and non-face-to-face coordinating care for this encounter was: 20 minutes     Chronic conditions status updated as per HPI.  Other than changes above, cont current medications and maintain follow up with specialists.  Return to clinic prn if symptoms worsen or fail to improve.    Aggie Cruz, FNP  [facility]

## 2024-04-05 DIAGNOSIS — G25.81 RESTLESS LEGS: ICD-10-CM

## 2024-04-05 RX ORDER — GABAPENTIN 100 MG/1
100 CAPSULE ORAL NIGHTLY
Qty: 90 CAPSULE | Refills: 1 | Status: SHIPPED | OUTPATIENT
Start: 2024-04-05 | End: 2024-05-14 | Stop reason: SDUPTHER

## 2024-04-05 NOTE — TELEPHONE ENCOUNTER
----- Message from Laila Ponce MA sent at 4/5/2024  9:18 AM CDT -----  Please send in gabapentin to [facility] for pt.

## 2024-04-12 ENCOUNTER — PATIENT OUTREACH (OUTPATIENT)
Dept: ADMINISTRATIVE | Facility: HOSPITAL | Age: 89
End: 2024-04-12

## 2024-04-12 NOTE — PROGRESS NOTES
Population Health Chart Review & Patient Outreach Details      Further Action Needed If Patient Returns Outreach:      24 upload recent matrix continuity of care      Updates Requested / Reviewed:     []  Care Everywhere    []     []  External Sources ([facility], [facility], DIS, etc.)    [] [facility]   [] [facility]   [] Other:    []  Care Team Updated   []  Removed  or Duplicate Orders   []  Immunization Reconciliation Completed / Queried    [] [place]   [] [place]   [] [place]   [] [place]      Health Maintenance Topics Addressed and Outreach Outcomes / Actions Taken:             Breast Cancer Screening []  Mammogram Order Placed    []  Mammogram Screening Scheduled    []  External Records Requested & Care Team Updated if Applicable    []  External Records Uploaded & Care Team Updated if Applicable    []  Pt Declined Scheduling Mammogram    []  Pt Will Schedule with External Provider / Order Routed & Care Team Updated if Applicable              Cervical Cancer Screening []  Pap Smear Scheduled in Primary Care or OBGYN    []  External Records Requested & Care Team Updated if Applicable       []  External Records Uploaded, Care Team Updated, & History Updated if Applicable    []  Patient Declined Scheduling Pap Smear    []  Patient Will Schedule with External Provider & Care Team Updated if Applicable                  Colorectal Cancer Screening []  Colonoscopy Case Request / Referral / Home Test Order Placed    []  External Records Requested & Care Team Updated if Applicable    []  External Records Uploaded, Care Team Updated, & History Updated if Applicable    []  Patient Declined Completing Colon Cancer Screening    []  Patient Will Schedule with External Provider & Care Team Updated if Applicable    []  Fit Kit Mailed (add the SmartPhrase under additional notes)    []  Reminded Patient to Complete Home Test                Diabetic Eye Exam []  Eye Exam Screening Order Placed    []  Eye Camera  Scheduled or Optometry/Ophthalmology Referral Placed    []  External Records Requested & Care Team Updated if Applicable    []  External Records Uploaded, Care Team Updated, & History Updated if Applicable    []  Patient Declined Scheduling Eye Exam    []  Patient Will Schedule with External Provider & Care Team Updated if Applicable             Blood Pressure Control []  Primary Care Follow Up Visit Scheduled     []  Remote Blood Pressure Reading Captured    []  Patient Declined Remote Reading or Scheduling Appt - Escalated to PCP    []  Patient Will Call Back or Send Portal Message with Reading                 HbA1c & Other Labs []  Overdue Lab(s) Ordered    []  Overdue Lab(s) Scheduled    []  External Records Uploaded & Care Team Updated if Applicable    []  Primary Care Follow Up Visit Scheduled     []  Reminded Patient to Complete A1c Home Test    []  Patient Declined Scheduling Labs or Will Call Back to Schedule    []  Patient Will Schedule with External Provider / Order Routed, & Care Team Updated if Applicable           Primary Care Appointment []  Primary Care Appt Scheduled    []  Patient Declined Scheduling or Will Call Back to Schedule    []  Pt Established with External Provider, Updated Care Team, & Informed Pt to Notify Payor if Applicable           Medication Adherence /    Statin Use []  Primary Care Appointment Scheduled    []  Patient Reminded to  Prescription    []  Patient Declined, Provider Notified if Needed    []  Sent Provider Message to Review to Evaluate Pt for Statin, Add Exclusion Dx Codes, Document   Exclusion in Problem List, Change Statin Intensity Level to Moderate or High Intensity if Applicable                Osteoporosis Screening []  Dexa Order Placed    []  Dexa Appointment Scheduled    []  External Records Requested & Care Team Updated    []  External Records Uploaded, Care Team Updated, & History Updated if Applicable    []  Patient Declined Scheduling Dexa or Will Call  Back to Schedule    []  Patient Will Schedule with External Provider / Order Routed & Care Team Updated if Applicable       Additional Notes:

## 2024-05-14 ENCOUNTER — OFFICE VISIT (OUTPATIENT)
Dept: FAMILY MEDICINE | Facility: CLINIC | Age: 89
End: 2024-05-14
Payer: MEDICARE

## 2024-05-14 VITALS
OXYGEN SATURATION: 96 % | DIASTOLIC BLOOD PRESSURE: 78 MMHG | RESPIRATION RATE: 18 BRPM | WEIGHT: 117 LBS | BODY MASS INDEX: 18.8 KG/M2 | TEMPERATURE: 98 F | HEART RATE: 80 BPM | SYSTOLIC BLOOD PRESSURE: 171 MMHG | HEIGHT: 66 IN

## 2024-05-14 DIAGNOSIS — Z86.73 H/O: CVA (CEREBROVASCULAR ACCIDENT): ICD-10-CM

## 2024-05-14 DIAGNOSIS — I10 ESSENTIAL HYPERTENSION: Primary | ICD-10-CM

## 2024-05-14 DIAGNOSIS — E78.5 DYSLIPIDEMIA: ICD-10-CM

## 2024-05-14 DIAGNOSIS — G25.81 RESTLESS LEGS: ICD-10-CM

## 2024-05-14 DIAGNOSIS — E03.9 ACQUIRED HYPOTHYROIDISM: ICD-10-CM

## 2024-05-14 DIAGNOSIS — J44.9 CHRONIC OBSTRUCTIVE PULMONARY DISEASE, UNSPECIFIED COPD TYPE: ICD-10-CM

## 2024-05-14 PROCEDURE — 99213 OFFICE O/P EST LOW 20 MIN: CPT | Mod: ,,, | Performed by: NURSE PRACTITIONER

## 2024-05-14 PROCEDURE — 1126F AMNT PAIN NOTED NONE PRSNT: CPT | Mod: ,,, | Performed by: NURSE PRACTITIONER

## 2024-05-14 PROCEDURE — 1101F PT FALLS ASSESS-DOCD LE1/YR: CPT | Mod: ,,, | Performed by: NURSE PRACTITIONER

## 2024-05-14 PROCEDURE — 1160F RVW MEDS BY RX/DR IN RCRD: CPT | Mod: ,,, | Performed by: NURSE PRACTITIONER

## 2024-05-14 PROCEDURE — 1159F MED LIST DOCD IN RCRD: CPT | Mod: ,,, | Performed by: NURSE PRACTITIONER

## 2024-05-14 PROCEDURE — 3288F FALL RISK ASSESSMENT DOCD: CPT | Mod: ,,, | Performed by: NURSE PRACTITIONER

## 2024-05-14 RX ORDER — ALBUTEROL SULFATE 90 UG/1
2 AEROSOL, METERED RESPIRATORY (INHALATION) EVERY 4 HOURS PRN
Qty: 8 G | Refills: 2 | Status: SHIPPED | OUTPATIENT
Start: 2024-05-14

## 2024-05-14 RX ORDER — CLOPIDOGREL BISULFATE 75 MG/1
75 TABLET ORAL DAILY
Qty: 90 TABLET | Refills: 1 | Status: SHIPPED | OUTPATIENT
Start: 2024-05-14

## 2024-05-14 RX ORDER — LEVOTHYROXINE SODIUM 88 UG/1
88 TABLET ORAL
Qty: 90 TABLET | Refills: 1 | Status: SHIPPED | OUTPATIENT
Start: 2024-05-14

## 2024-05-14 RX ORDER — LISINOPRIL 20 MG/1
20 TABLET ORAL DAILY
Qty: 90 TABLET | Refills: 1 | Status: SHIPPED | OUTPATIENT
Start: 2024-05-14

## 2024-05-14 RX ORDER — GABAPENTIN 100 MG/1
100 CAPSULE ORAL NIGHTLY
Qty: 90 CAPSULE | Refills: 1 | Status: SHIPPED | OUTPATIENT
Start: 2024-05-14

## 2024-05-14 RX ORDER — AMLODIPINE BESYLATE 5 MG/1
5 TABLET ORAL DAILY
Qty: 90 TABLET | Refills: 3 | Status: SHIPPED | OUTPATIENT
Start: 2024-05-14

## 2024-05-14 RX ORDER — ATORVASTATIN CALCIUM 20 MG/1
20 TABLET, FILM COATED ORAL NIGHTLY
Qty: 90 TABLET | Refills: 1 | Status: SHIPPED | OUTPATIENT
Start: 2024-05-14

## 2024-05-14 RX ORDER — ROPINIROLE 0.5 MG/1
TABLET, FILM COATED ORAL
Qty: 90 TABLET | Refills: 1 | Status: SHIPPED | OUTPATIENT
Start: 2024-05-14

## 2024-05-14 NOTE — PROGRESS NOTES
[facility] Medicine    Chief Complaint      Chief Complaint   Patient presents with    Hypertension     X3 months , medication refill     care gaps      Patient stated they would get all vaccinations from [facility] pharmacy.        History of Present Illness      Gregory Plasencia is a 89 y.o. male. He  has a past medical history of Hypertension, Hypothyroidism, unspecified, and Stroke., who presents today for 3 mo f/u of his HTN.  Denies any complaints today.  States everything is going well.     Past Medical History:  Past Medical History:   Diagnosis Date    Hypertension     Hypothyroidism, unspecified     Stroke        Past Surgical History:   has a past surgical history that includes Back surgery.    Social History:  Social History     Tobacco Use    Smoking status: Never     Passive exposure: Never    Smokeless tobacco: Former     Types: Chew   Substance Use Topics    Alcohol use: Never    Drug use: Never       I personally reviewed all past medical, surgical, and social.     Review of Systems   Constitutional:  Negative for fatigue and unexpected weight change.   HENT:  Negative for tinnitus.    Eyes:  Negative for visual disturbance.   Respiratory:  Negative for shortness of breath.    Cardiovascular: Negative.    Gastrointestinal:  Negative for abdominal pain, constipation, diarrhea, nausea and vomiting.   Genitourinary:  Negative for difficulty urinating.   Musculoskeletal:  Negative for gait problem.   Skin: Negative.    Neurological:  Negative for dizziness, light-headedness and headaches.        Medications:  Outpatient Encounter Medications as of 5/14/2024   Medication Sig Dispense Refill    [DISCONTINUED] albuterol (PROVENTIL/VENTOLIN HFA) 90 mcg/actuation inhaler Inhale 2 puffs into the lungs every 4 (four) hours as needed.      [DISCONTINUED] amLODIPine (NORVASC) 5 MG tablet TAKE 1 TABLET ONE TIME DAILY 90 tablet 3    [DISCONTINUED] atorvastatin (LIPITOR) 20 MG tablet Take 1 tablet (20 mg total) by  mouth every evening. 90 tablet 1    [DISCONTINUED] clopidogreL (PLAVIX) 75 mg tablet Take 1 tablet (75 mg total) by mouth once daily. 90 tablet 1    [DISCONTINUED] gabapentin (NEURONTIN) 100 MG capsule Take 1 capsule (100 mg total) by mouth every evening. 90 capsule 1    [DISCONTINUED] levothyroxine (SYNTHROID) 88 MCG tablet Take 1 tablet (88 mcg total) by mouth before breakfast. 90 tablet 1    [DISCONTINUED] lisinopriL (PRINIVIL,ZESTRIL) 20 MG tablet Take 1 tablet (20 mg total) by mouth once daily. 90 tablet 1    [DISCONTINUED] rOPINIRole (REQUIP) 0.5 MG tablet Take 1 tab by mouth at bedtime for restless legs. 90 tablet 1    albuterol (PROVENTIL/VENTOLIN HFA) 90 mcg/actuation inhaler Inhale 2 puffs into the lungs every 4 (four) hours as needed for Shortness of Breath. 8 g 2    amLODIPine (NORVASC) 5 MG tablet Take 1 tablet (5 mg total) by mouth once daily. 90 tablet 3    atorvastatin (LIPITOR) 20 MG tablet Take 1 tablet (20 mg total) by mouth every evening. 90 tablet 1    clopidogreL (PLAVIX) 75 mg tablet Take 1 tablet (75 mg total) by mouth once daily. 90 tablet 1    gabapentin (NEURONTIN) 100 MG capsule Take 1 capsule (100 mg total) by mouth every evening. 90 capsule 1    levothyroxine (SYNTHROID) 88 MCG tablet Take 1 tablet (88 mcg total) by mouth before breakfast. 90 tablet 1    lisinopriL (PRINIVIL,ZESTRIL) 20 MG tablet Take 1 tablet (20 mg total) by mouth once daily. 90 tablet 1    rOPINIRole (REQUIP) 0.5 MG tablet Take 1 tab by mouth at bedtime for restless legs. 90 tablet 1     No facility-administered encounter medications on file as of 5/14/2024.       Allergies:  Review of patient's allergies indicates:   Allergen Reactions    Gadolinium-containing contrast media        Health Maintenance:  Immunization History   Administered Date(s) Administered    COVID-19, MRNA, LN-S, PF (MODERNA FULL 0.5 ML DOSE) 02/11/2021, 03/11/2021, 11/04/2021    COVID-19, mRNA, LNP-S, bivalent booster, PF (Moderna Omicron)12 +  "YEARS 10/31/2022    Tdap 08/21/2023      Health Maintenance   Topic Date Due    Shingles Vaccine (1 of 2) Never done    Lipid Panel  02/08/2028    TETANUS VACCINE  08/21/2033        Physical Exam      Vital Signs  Temp: 98 °F (36.7 °C)  Temp Source: Oral  Pulse: 80  Resp: 18  SpO2: 96 %  BP: (!) 171/78  BP Location: Left arm  Patient Position: Sitting  Pain Score: 0-No pain  Height and Weight  Height: 5' 6" (167.6 cm)  Weight: 53.1 kg (117 lb)  BSA (Calculated - sq m): 1.57 sq meters  BMI (Calculated): 18.9  Weight in (lb) to have BMI = 25: 154.6]    Physical Exam     Laboratory:  CBC:  Recent Labs   Lab 11/10/22  0857 02/08/23  0853 03/14/23  1600   WBC 6.95 7.18 8.46   RBC 4.58 L 4.30 L 4.70   Hemoglobin 12.7 L 12.0 L 12.9 L   Hematocrit 41.2 37.9 L 41.2   Platelet Count 259 250 319   MCV 90.0 88.1 87.7   MCH 27.7 27.9 27.4   MCHC 30.8 L 31.7 L 31.3 L     CMP:  Recent Labs   Lab 02/08/23  0853 03/14/23  1600 11/14/23  1048 02/14/24  1014   Glucose 96 96 96 91   Calcium 9.0 9.6 9.2 9.9   Albumin 3.3 L 3.9 3.4 L  --    Total Protein 6.8 8.6 H 7.4  --    Sodium 144 136 141 139   Potassium 3.4 L 4.4 4.1 4.3   CO2 28 27 27 29   Chloride 108 H 105 107 107   BUN 9 27 H 20 H 20 H   Alk Phos 130 H 136 H 115  --    ALT 23 35 30  --    AST 30 42 H 30  --    Bilirubin, Total 0.7 0.5 0.3  --      LIPIDS:  Recent Labs   Lab 06/08/21  0911 11/10/22  0857 02/08/23  0853 03/14/23  1600 11/14/23  1048   TSH  --  0.048 L 0.018 L 4.470 H 3.690   HDL Cholesterol 56 54 47  --   --    Cholesterol 120 112 106  --   --    Triglycerides 109 71 90  --   --    LDL Calculated 42 44 41  --   --    Cholesterol/HDL Ratio (Risk Factor) 2.1 2.1 2.3  --   --    Non-HDL 64 58 59  --   --      TSH:  Recent Labs   Lab 02/08/23  0853 03/14/23  1600 11/14/23  1048   TSH 0.018 L 4.470 H 3.690     A1C:        Assessment/Plan     Gregory Plasencia is a 89 y.o.male with:     1. Essential hypertension  -     amLODIPine (NORVASC) 5 MG tablet; Take 1 tablet (5 " mg total) by mouth once daily.  Dispense: 90 tablet; Refill: 3  -     lisinopriL (PRINIVIL,ZESTRIL) 20 MG tablet; Take 1 tablet (20 mg total) by mouth once daily.  Dispense: 90 tablet; Refill: 1    2. Dyslipidemia  -     atorvastatin (LIPITOR) 20 MG tablet; Take 1 tablet (20 mg total) by mouth every evening.  Dispense: 90 tablet; Refill: 1    3. H/O: CVA (cerebrovascular accident)  -     clopidogreL (PLAVIX) 75 mg tablet; Take 1 tablet (75 mg total) by mouth once daily.  Dispense: 90 tablet; Refill: 1    4. Restless legs  -     gabapentin (NEURONTIN) 100 MG capsule; Take 1 capsule (100 mg total) by mouth every evening.  Dispense: 90 capsule; Refill: 1  -     rOPINIRole (REQUIP) 0.5 MG tablet; Take 1 tab by mouth at bedtime for restless legs.  Dispense: 90 tablet; Refill: 1    5. Acquired hypothyroidism  -     levothyroxine (SYNTHROID) 88 MCG tablet; Take 1 tablet (88 mcg total) by mouth before breakfast.  Dispense: 90 tablet; Refill: 1    6. Chronic obstructive pulmonary disease, unspecified COPD type  -     albuterol (PROVENTIL/VENTOLIN HFA) 90 mcg/actuation inhaler; Inhale 2 puffs into the lungs every 4 (four) hours as needed for Shortness of Breath.  Dispense: 8 g; Refill: 2         Total time spent face-to-face and non-face-to-face coordinating care for this encounter was: 20 minutes     Chronic conditions status updated as per HPI.  Other than changes above, cont current medications and maintain follow up with specialists.  Return to clinic in 1 month(s).    ANDREI Albright  [facility]

## 2024-05-15 ENCOUNTER — PATIENT OUTREACH (OUTPATIENT)
Dept: FAMILY MEDICINE | Facility: CLINIC | Age: 89
End: 2024-05-15
Payer: MEDICARE

## 2024-05-15 NOTE — PROGRESS NOTES
Population Health Chart Review & Patient Outreach Details      Further Action Needed If Patient Returns Outreach:      5/15/24 no immunizations to upload at this time from [facility] will reach out to provider. TC,Lpn-CCC      Updates Requested / Reviewed:     []  Care Everywhere    []     []  External Sources ([facility], [facility], [facility], etc.)    [] [facility]   [] [facility]   [] Other:    []  Care Team Updated   []  Removed  or Duplicate Orders   [x]  Immunization Reconciliation Completed / Queried    [] [place]   [x] [place]   [] [place]   [] [place]      Health Maintenance Topics Addressed and Outreach Outcomes / Actions Taken:             Breast Cancer Screening []  Mammogram Order Placed    []  Mammogram Screening Scheduled    []  External Records Requested & Care Team Updated if Applicable    []  External Records Uploaded & Care Team Updated if Applicable    []  Pt Declined Scheduling Mammogram    []  Pt Will Schedule with External Provider / Order Routed & Care Team Updated if Applicable              Cervical Cancer Screening []  Pap Smear Scheduled in Primary Care or OBGYN    []  External Records Requested & Care Team Updated if Applicable       []  External Records Uploaded, Care Team Updated, & History Updated if Applicable    []  Patient Declined Scheduling Pap Smear    []  Patient Will Schedule with External Provider & Care Team Updated if Applicable                  Colorectal Cancer Screening []  Colonoscopy Case Request / Referral / Home Test Order Placed    []  External Records Requested & Care Team Updated if Applicable    []  External Records Uploaded, Care Team Updated, & History Updated if Applicable    []  Patient Declined Completing Colon Cancer Screening    []  Patient Will Schedule with External Provider & Care Team Updated if Applicable    []  Fit Kit Mailed (add the SmartPhrase under additional notes)    []  Reminded Patient to Complete Home Test                Diabetic Eye Exam []   Eye Exam Screening Order Placed    []  Eye Camera Scheduled or Optometry/Ophthalmology Referral Placed    []  External Records Requested & Care Team Updated if Applicable    []  External Records Uploaded, Care Team Updated, & History Updated if Applicable    []  Patient Declined Scheduling Eye Exam    []  Patient Will Schedule with External Provider & Care Team Updated if Applicable             Blood Pressure Control []  Primary Care Follow Up Visit Scheduled     []  Remote Blood Pressure Reading Captured    []  Patient Declined Remote Reading or Scheduling Appt - Escalated to PCP    []  Patient Will Call Back or Send Portal Message with Reading                 HbA1c & Other Labs []  Overdue Lab(s) Ordered    []  Overdue Lab(s) Scheduled    []  External Records Uploaded & Care Team Updated if Applicable    []  Primary Care Follow Up Visit Scheduled     []  Reminded Patient to Complete A1c Home Test    []  Patient Declined Scheduling Labs or Will Call Back to Schedule    []  Patient Will Schedule with External Provider / Order Routed, & Care Team Updated if Applicable           Primary Care Appointment []  Primary Care Appt Scheduled    []  Patient Declined Scheduling or Will Call Back to Schedule    []  Pt Established with External Provider, Updated Care Team, & Informed Pt to Notify Payor if Applicable           Medication Adherence /    Statin Use []  Primary Care Appointment Scheduled    []  Patient Reminded to  Prescription    []  Patient Declined, Provider Notified if Needed    []  Sent Provider Message to Review to Evaluate Pt for Statin, Add Exclusion Dx Codes, Document   Exclusion in Problem List, Change Statin Intensity Level to Moderate or High Intensity if Applicable                Osteoporosis Screening []  Dexa Order Placed    []  Dexa Appointment Scheduled    []  External Records Requested & Care Team Updated    []  External Records Uploaded, Care Team Updated, & History Updated if Applicable     []  Patient Declined Scheduling Dexa or Will Call Back to Schedule    []  Patient Will Schedule with External Provider / Order Routed & Care Team Updated if Applicable       Additional Notes:

## 2024-08-14 ENCOUNTER — OFFICE VISIT (OUTPATIENT)
Dept: FAMILY MEDICINE | Facility: CLINIC | Age: 89
End: 2024-08-14
Payer: MEDICARE

## 2024-08-14 VITALS
RESPIRATION RATE: 15 BRPM | HEIGHT: 66 IN | WEIGHT: 117 LBS | DIASTOLIC BLOOD PRESSURE: 72 MMHG | OXYGEN SATURATION: 97 % | TEMPERATURE: 98 F | HEART RATE: 71 BPM | SYSTOLIC BLOOD PRESSURE: 160 MMHG | BODY MASS INDEX: 18.8 KG/M2

## 2024-08-14 DIAGNOSIS — G25.81 RESTLESS LEGS: ICD-10-CM

## 2024-08-14 DIAGNOSIS — Z86.73 H/O: CVA (CEREBROVASCULAR ACCIDENT): ICD-10-CM

## 2024-08-14 DIAGNOSIS — E78.5 DYSLIPIDEMIA: ICD-10-CM

## 2024-08-14 DIAGNOSIS — E03.9 ACQUIRED HYPOTHYROIDISM: ICD-10-CM

## 2024-08-14 DIAGNOSIS — I10 ESSENTIAL HYPERTENSION: ICD-10-CM

## 2024-08-14 LAB
ALBUMIN SERPL BCP-MCNC: 3.2 G/DL (ref 3.5–5)
ALBUMIN/GLOB SERPL: 1 {RATIO}
ALP SERPL-CCNC: 102 U/L (ref 45–115)
ALT SERPL W P-5'-P-CCNC: 28 U/L (ref 16–61)
ANION GAP SERPL CALCULATED.3IONS-SCNC: 9 MMOL/L (ref 7–16)
AST SERPL W P-5'-P-CCNC: 29 U/L (ref 15–37)
BASOPHILS # BLD AUTO: 0.04 K/UL (ref 0–0.2)
BASOPHILS NFR BLD AUTO: 0.7 % (ref 0–1)
BILIRUB SERPL-MCNC: 0.5 MG/DL (ref ?–1.2)
BUN SERPL-MCNC: 30 MG/DL (ref 7–18)
BUN/CREAT SERPL: 26 (ref 6–20)
CALCIUM SERPL-MCNC: 8.9 MG/DL (ref 8.5–10.1)
CHLORIDE SERPL-SCNC: 110 MMOL/L (ref 98–107)
CHOLEST SERPL-MCNC: 115 MG/DL (ref 0–200)
CHOLEST/HDLC SERPL: 1.9 {RATIO}
CO2 SERPL-SCNC: 27 MMOL/L (ref 21–32)
CREAT SERPL-MCNC: 1.15 MG/DL (ref 0.7–1.3)
DIFFERENTIAL METHOD BLD: ABNORMAL
EGFR (NO RACE VARIABLE) (RUSH/TITUS): 60 ML/MIN/1.73M2
EOSINOPHIL # BLD AUTO: 0.13 K/UL (ref 0–0.5)
EOSINOPHIL NFR BLD AUTO: 2.3 % (ref 1–4)
ERYTHROCYTE [DISTWIDTH] IN BLOOD BY AUTOMATED COUNT: 14.1 % (ref 11.5–14.5)
GLOBULIN SER-MCNC: 3.3 G/DL (ref 2–4)
GLUCOSE SERPL-MCNC: 114 MG/DL (ref 74–106)
HCT VFR BLD AUTO: 34.4 % (ref 40–54)
HDLC SERPL-MCNC: 62 MG/DL (ref 40–60)
HGB BLD-MCNC: 10.5 G/DL (ref 13.5–18)
IMM GRANULOCYTES # BLD AUTO: 0.01 K/UL (ref 0–0.04)
IMM GRANULOCYTES NFR BLD: 0.2 % (ref 0–0.4)
LDLC SERPL CALC-MCNC: 38 MG/DL
LYMPHOCYTES # BLD AUTO: 2.59 K/UL (ref 1–4.8)
LYMPHOCYTES NFR BLD AUTO: 45.3 % (ref 27–41)
MCH RBC QN AUTO: 27.4 PG (ref 27–31)
MCHC RBC AUTO-ENTMCNC: 30.5 G/DL (ref 32–36)
MCV RBC AUTO: 89.8 FL (ref 80–96)
MONOCYTES # BLD AUTO: 0.74 K/UL (ref 0–0.8)
MONOCYTES NFR BLD AUTO: 12.9 % (ref 2–6)
MPC BLD CALC-MCNC: 10.6 FL (ref 9.4–12.4)
NEUTROPHILS # BLD AUTO: 2.21 K/UL (ref 1.8–7.7)
NEUTROPHILS NFR BLD AUTO: 38.6 % (ref 53–65)
NONHDLC SERPL-MCNC: 53 MG/DL
NRBC # BLD AUTO: 0 X10E3/UL
NRBC, AUTO (.00): 0 %
PLATELET # BLD AUTO: 226 K/UL (ref 150–400)
POTASSIUM SERPL-SCNC: 4.2 MMOL/L (ref 3.5–5.1)
PROT SERPL-MCNC: 6.5 G/DL (ref 6.4–8.2)
RBC # BLD AUTO: 3.83 M/UL (ref 4.6–6.2)
SODIUM SERPL-SCNC: 142 MMOL/L (ref 136–145)
TRIGL SERPL-MCNC: 75 MG/DL (ref 35–150)
TSH SERPL DL<=0.005 MIU/L-ACNC: 0.02 UIU/ML (ref 0.36–3.74)
VLDLC SERPL-MCNC: 15 MG/DL
WBC # BLD AUTO: 5.72 K/UL (ref 4.5–11)

## 2024-08-14 PROCEDURE — 1159F MED LIST DOCD IN RCRD: CPT | Mod: ,,, | Performed by: NURSE PRACTITIONER

## 2024-08-14 PROCEDURE — 3288F FALL RISK ASSESSMENT DOCD: CPT | Mod: ,,, | Performed by: NURSE PRACTITIONER

## 2024-08-14 PROCEDURE — 80050 GENERAL HEALTH PANEL: CPT | Mod: ,,, | Performed by: CLINICAL MEDICAL LABORATORY

## 2024-08-14 PROCEDURE — 80061 LIPID PANEL: CPT | Mod: ,,, | Performed by: CLINICAL MEDICAL LABORATORY

## 2024-08-14 PROCEDURE — 99214 OFFICE O/P EST MOD 30 MIN: CPT | Mod: ,,, | Performed by: NURSE PRACTITIONER

## 2024-08-14 PROCEDURE — 1160F RVW MEDS BY RX/DR IN RCRD: CPT | Mod: ,,, | Performed by: NURSE PRACTITIONER

## 2024-08-14 PROCEDURE — 1126F AMNT PAIN NOTED NONE PRSNT: CPT | Mod: ,,, | Performed by: NURSE PRACTITIONER

## 2024-08-14 PROCEDURE — 1101F PT FALLS ASSESS-DOCD LE1/YR: CPT | Mod: ,,, | Performed by: NURSE PRACTITIONER

## 2024-08-14 RX ORDER — ATORVASTATIN CALCIUM 20 MG/1
20 TABLET, FILM COATED ORAL NIGHTLY
Qty: 90 TABLET | Refills: 1 | Status: SHIPPED | OUTPATIENT
Start: 2024-08-14

## 2024-08-14 RX ORDER — LISINOPRIL 20 MG/1
20 TABLET ORAL DAILY
Qty: 90 TABLET | Refills: 1 | Status: SHIPPED | OUTPATIENT
Start: 2024-08-14

## 2024-08-14 RX ORDER — ROPINIROLE 0.5 MG/1
TABLET, FILM COATED ORAL
Qty: 90 TABLET | Refills: 1 | Status: SHIPPED | OUTPATIENT
Start: 2024-08-14

## 2024-08-14 RX ORDER — AMLODIPINE BESYLATE 5 MG/1
5 TABLET ORAL DAILY
Qty: 90 TABLET | Refills: 1 | Status: SHIPPED | OUTPATIENT
Start: 2024-08-14

## 2024-08-14 RX ORDER — LEVOTHYROXINE SODIUM 88 UG/1
88 TABLET ORAL
Qty: 90 TABLET | Refills: 1 | Status: SHIPPED | OUTPATIENT
Start: 2024-08-14

## 2024-08-14 RX ORDER — CLOPIDOGREL BISULFATE 75 MG/1
75 TABLET ORAL DAILY
Qty: 90 TABLET | Refills: 1 | Status: SHIPPED | OUTPATIENT
Start: 2024-08-14

## 2024-08-14 RX ORDER — GABAPENTIN 100 MG/1
100 CAPSULE ORAL NIGHTLY
Qty: 90 CAPSULE | Refills: 1 | Status: SHIPPED | OUTPATIENT
Start: 2024-08-14

## 2024-08-14 NOTE — PROGRESS NOTES
[facility]    Chief Complaint      Chief Complaint   Patient presents with    Hypertension     Medication refills , No other complaints at OV today        History of Present Illness      Gregory Plasencia is a 89 y.o. male. He  has a past medical history of Hypertension, Hypothyroidism, unspecified, and Stroke., who presents today for f/u of his HTN and Thyroid with lab work and medication refills.  He is accompanied by his wife and denies any complaints today.     Past Medical History:  Past Medical History:   Diagnosis Date    Hypertension     Hypothyroidism, unspecified     Stroke        Past Surgical History:   has a past surgical history that includes Back surgery.    Social History:  Social History     Tobacco Use    Smoking status: Never     Passive exposure: Never    Smokeless tobacco: Former     Types: Chew   Substance Use Topics    Alcohol use: Never    Drug use: Never       I personally reviewed all past medical, surgical, and social.     Review of Systems   Constitutional:  Negative for fatigue and unexpected weight change.   HENT:  Negative for trouble swallowing.    Eyes:  Negative for visual disturbance.   Respiratory:  Negative for shortness of breath.    Cardiovascular: Negative.    Gastrointestinal:  Negative for abdominal pain, constipation, diarrhea, nausea and vomiting.   Genitourinary:  Negative for difficulty urinating.   Musculoskeletal:  Positive for gait problem.   Skin: Negative.    Neurological:  Negative for dizziness, light-headedness and headaches.        Medications:  Outpatient Encounter Medications as of 8/14/2024   Medication Sig Dispense Refill    albuterol (PROVENTIL/VENTOLIN HFA) 90 mcg/actuation inhaler Inhale 2 puffs into the lungs every 4 (four) hours as needed for Shortness of Breath. 8 g 2    [DISCONTINUED] amLODIPine (NORVASC) 5 MG tablet Take 1 tablet (5 mg total) by mouth once daily. 90 tablet 3    [DISCONTINUED] atorvastatin (LIPITOR) 20 MG tablet Take 1 tablet  (20 mg total) by mouth every evening. 90 tablet 1    [DISCONTINUED] clopidogreL (PLAVIX) 75 mg tablet Take 1 tablet (75 mg total) by mouth once daily. 90 tablet 1    [DISCONTINUED] gabapentin (NEURONTIN) 100 MG capsule Take 1 capsule (100 mg total) by mouth every evening. 90 capsule 1    [DISCONTINUED] levothyroxine (SYNTHROID) 88 MCG tablet Take 1 tablet (88 mcg total) by mouth before breakfast. 90 tablet 1    [DISCONTINUED] lisinopriL (PRINIVIL,ZESTRIL) 20 MG tablet Take 1 tablet (20 mg total) by mouth once daily. 90 tablet 1    [DISCONTINUED] rOPINIRole (REQUIP) 0.5 MG tablet Take 1 tab by mouth at bedtime for restless legs. 90 tablet 1    amLODIPine (NORVASC) 5 MG tablet Take 1 tablet (5 mg total) by mouth once daily. 90 tablet 1    atorvastatin (LIPITOR) 20 MG tablet Take 1 tablet (20 mg total) by mouth every evening. 90 tablet 1    clopidogreL (PLAVIX) 75 mg tablet Take 1 tablet (75 mg total) by mouth once daily. 90 tablet 1    gabapentin (NEURONTIN) 100 MG capsule Take 1 capsule (100 mg total) by mouth every evening. 90 capsule 1    levothyroxine (SYNTHROID) 88 MCG tablet Take 1 tablet (88 mcg total) by mouth before breakfast. 90 tablet 1    lisinopriL (PRINIVIL,ZESTRIL) 20 MG tablet Take 1 tablet (20 mg total) by mouth once daily. 90 tablet 1    rOPINIRole (REQUIP) 0.5 MG tablet Take 1 tab by mouth at bedtime for restless legs. 90 tablet 1     No facility-administered encounter medications on file as of 8/14/2024.       Allergies:  Review of patient's allergies indicates:   Allergen Reactions    Gadolinium-containing contrast media        Health Maintenance:  Immunization History   Administered Date(s) Administered    COVID-19, MRNA, LN-S, PF (MODERNA FULL 0.5 ML DOSE) 02/11/2021, 03/11/2021, 11/04/2021    COVID-19, mRNA, LNP-S, bivalent booster, PF (Moderna Omicron)12 + YEARS 10/31/2022    Tdap 08/21/2023      Health Maintenance   Topic Date Due    Shingles Vaccine (1 of 2) Never done    Lipid Panel   "02/08/2028    TETANUS VACCINE  08/21/2033        Physical Exam      Vital Signs  Temp: 98.3 °F (36.8 °C)  Temp Source: Oral  Pulse: 71  Resp: 15  SpO2: 97 %  BP: (!) 160/72  BP Location: Right arm  Patient Position: Sitting  Pain Score: 0-No pain  Height and Weight  Height: 5' 6" (167.6 cm)  Weight: 53.1 kg (117 lb)  BSA (Calculated - sq m): 1.57 sq meters  BMI (Calculated): 18.9  Weight in (lb) to have BMI = 25: 154.6]    Physical Exam  Vitals and nursing note reviewed.   Constitutional:       Appearance: Normal appearance.   HENT:      Head: Normocephalic.      Right Ear: Hearing normal.      Left Ear: Hearing normal.      Nose: Nose normal.   Eyes:      General: Lids are normal.      Conjunctiva/sclera: Conjunctivae normal.   Neck:      Thyroid: No thyromegaly.   Cardiovascular:      Rate and Rhythm: Normal rate and regular rhythm.      Heart sounds: Normal heart sounds.   Pulmonary:      Effort: Pulmonary effort is normal.      Breath sounds: Normal breath sounds.   Musculoskeletal:         General: Normal range of motion.      Cervical back: Normal range of motion and neck supple.      Right lower leg: No edema.      Left lower leg: No edema.   Skin:     General: Skin is warm and dry.      Findings: No rash.   Neurological:      General: No focal deficit present.      Mental Status: He is alert and oriented to person, place, and time.      Gait: Gait abnormal.      Comments: Ambulates with cane          Laboratory:  CBC:  Recent Labs   Lab 11/10/22  0857 02/08/23  0853 03/14/23  1600   WBC 6.95 7.18 8.46   RBC 4.58 L 4.30 L 4.70   Hemoglobin 12.7 L 12.0 L 12.9 L   Hematocrit 41.2 37.9 L 41.2   Platelet Count 259 250 319   MCV 90.0 88.1 87.7   MCH 27.7 27.9 27.4   MCHC 30.8 L 31.7 L 31.3 L     CMP:  Recent Labs   Lab 02/08/23  0853 03/14/23  1600 11/14/23  1048 02/14/24  1014   Glucose 96 96 96 91   Calcium 9.0 9.6 9.2 9.9   Albumin 3.3 L 3.9 3.4 L  --    Total Protein 6.8 8.6 H 7.4  --    Sodium 144 136 141 139 "   Potassium 3.4 L 4.4 4.1 4.3   CO2 28 27 27 29   Chloride 108 H 105 107 107   BUN 9 27 H 20 H 20 H   Alk Phos 130 H 136 H 115  --    ALT 23 35 30  --    AST 30 42 H 30  --    Bilirubin, Total 0.7 0.5 0.3  --      LIPIDS:  Recent Labs   Lab 11/10/22  0857 02/08/23  0853 03/14/23  1600 11/14/23  1048   TSH 0.048 L 0.018 L 4.470 H 3.690   HDL Cholesterol 54 47  --   --    Cholesterol 112 106  --   --    Triglycerides 71 90  --   --    LDL Calculated 44 41  --   --    Cholesterol/HDL Ratio (Risk Factor) 2.1 2.3  --   --    Non-HDL 58 59  --   --      TSH:  Recent Labs   Lab 02/08/23  0853 03/14/23  1600 11/14/23  1048   TSH 0.018 L 4.470 H 3.690     A1C:        Assessment/Plan     Gregory Plasencia is a 89 y.o.male with:     1. Essential hypertension  -     amLODIPine (NORVASC) 5 MG tablet; Take 1 tablet (5 mg total) by mouth once daily.  Dispense: 90 tablet; Refill: 1  -     lisinopriL (PRINIVIL,ZESTRIL) 20 MG tablet; Take 1 tablet (20 mg total) by mouth once daily.  Dispense: 90 tablet; Refill: 1  -     CBC Auto Differential; Future; Expected date: 08/14/2024  -     Comprehensive Metabolic Panel; Future; Expected date: 08/14/2024    2. Dyslipidemia  -     atorvastatin (LIPITOR) 20 MG tablet; Take 1 tablet (20 mg total) by mouth every evening.  Dispense: 90 tablet; Refill: 1  -     Lipid Panel; Future; Expected date: 08/14/2024    3. H/O: CVA (cerebrovascular accident)  -     clopidogreL (PLAVIX) 75 mg tablet; Take 1 tablet (75 mg total) by mouth once daily.  Dispense: 90 tablet; Refill: 1    4. Restless legs  -     gabapentin (NEURONTIN) 100 MG capsule; Take 1 capsule (100 mg total) by mouth every evening.  Dispense: 90 capsule; Refill: 1  -     rOPINIRole (REQUIP) 0.5 MG tablet; Take 1 tab by mouth at bedtime for restless legs.  Dispense: 90 tablet; Refill: 1    5. Acquired hypothyroidism  -     levothyroxine (SYNTHROID) 88 MCG tablet; Take 1 tablet (88 mcg total) by mouth before breakfast.  Dispense: 90 tablet;  Refill: 1  -     TSH; Future; Expected date: 08/14/2024       Reviewed prior lab work; patient due today for fasting labs    Total time spent face-to-face and non-face-to-face coordinating care for this encounter was: 30 minutes    Chronic conditions status updated as per HPI.  Other than changes above, cont current medications and maintain follow up with specialists.  Return to clinic in 6 month(s).    ANDREI Albright  [facility]

## 2024-08-20 DIAGNOSIS — Z71.89 COMPLEX CARE COORDINATION: ICD-10-CM

## 2024-08-21 DIAGNOSIS — E03.9 ACQUIRED HYPOTHYROIDISM: Primary | ICD-10-CM

## 2024-08-21 RX ORDER — LEVOTHYROXINE SODIUM 75 UG/1
75 TABLET ORAL
Qty: 30 TABLET | Refills: 3 | Status: SHIPPED | OUTPATIENT
Start: 2024-08-21

## 2024-08-26 ENCOUNTER — TELEPHONE (OUTPATIENT)
Dept: FAMILY MEDICINE | Facility: CLINIC | Age: 89
End: 2024-08-26
Payer: MEDICARE

## 2024-08-26 NOTE — TELEPHONE ENCOUNTER
Notified patient wife , that Patient needs to increase his daily water intake- BUN 30 wife states she will attempt  , however she states it is hard to get patient to drink water; TSH is low- will adjust his dosage; RBC and H&H are a little low- recommend rechecking in 2 weeks. Wife verbalized understanding and states they will come in for those labs in 2 weeks.

## 2024-08-26 NOTE — TELEPHONE ENCOUNTER
----- Message from ANDREI Albright sent at 8/21/2024 12:02 AM CDT -----  Patient needs to increase his daily water intake- BUN 30; TSH is low- will adjust his dosage; RBC and H&H are a little low- recommend rechecking in 2 weeks.

## 2024-09-06 ENCOUNTER — TELEPHONE (OUTPATIENT)
Dept: FAMILY MEDICINE | Facility: CLINIC | Age: 89
End: 2024-09-06
Payer: MEDICARE

## 2024-12-17 DIAGNOSIS — J44.9 CHRONIC OBSTRUCTIVE PULMONARY DISEASE, UNSPECIFIED COPD TYPE: ICD-10-CM

## 2024-12-17 RX ORDER — ALBUTEROL SULFATE 90 UG/1
2 INHALANT RESPIRATORY (INHALATION) EVERY 4 HOURS PRN
Qty: 18 G | Refills: 2 | Status: SHIPPED | OUTPATIENT
Start: 2024-12-17

## 2025-01-31 DIAGNOSIS — G25.81 RESTLESS LEGS: ICD-10-CM

## 2025-01-31 DIAGNOSIS — E78.5 DYSLIPIDEMIA: ICD-10-CM

## 2025-01-31 RX ORDER — ROPINIROLE 0.5 MG/1
TABLET, FILM COATED ORAL
Qty: 90 TABLET | Refills: 3 | Status: SHIPPED | OUTPATIENT
Start: 2025-01-31

## 2025-01-31 RX ORDER — ATORVASTATIN CALCIUM 20 MG/1
20 TABLET, FILM COATED ORAL NIGHTLY
Qty: 90 TABLET | Refills: 3 | Status: SHIPPED | OUTPATIENT
Start: 2025-01-31

## 2025-02-01 DIAGNOSIS — J44.9 CHRONIC OBSTRUCTIVE PULMONARY DISEASE, UNSPECIFIED COPD TYPE: ICD-10-CM

## 2025-02-04 RX ORDER — ALBUTEROL SULFATE 90 UG/1
2 INHALANT RESPIRATORY (INHALATION) EVERY 4 HOURS PRN
Qty: 18 G | Refills: 2 | Status: SHIPPED | OUTPATIENT
Start: 2025-02-04

## 2025-02-21 DIAGNOSIS — Z86.73 H/O: CVA (CEREBROVASCULAR ACCIDENT): ICD-10-CM

## 2025-02-21 RX ORDER — CLOPIDOGREL BISULFATE 75 MG/1
75 TABLET ORAL
Qty: 90 TABLET | Refills: 3 | OUTPATIENT
Start: 2025-02-21

## 2025-02-21 RX ORDER — LEVOTHYROXINE SODIUM 88 UG/1
88 TABLET ORAL
Qty: 90 TABLET | Refills: 3 | OUTPATIENT
Start: 2025-02-21

## 2025-03-27 DIAGNOSIS — I10 ESSENTIAL HYPERTENSION: ICD-10-CM

## 2025-03-27 DIAGNOSIS — J44.9 CHRONIC OBSTRUCTIVE PULMONARY DISEASE, UNSPECIFIED COPD TYPE: ICD-10-CM

## 2025-03-27 DIAGNOSIS — G25.81 RESTLESS LEGS: ICD-10-CM

## 2025-03-27 DIAGNOSIS — E78.5 DYSLIPIDEMIA: ICD-10-CM

## 2025-03-27 DIAGNOSIS — Z86.73 H/O: CVA (CEREBROVASCULAR ACCIDENT): ICD-10-CM

## 2025-03-27 DIAGNOSIS — E03.9 ACQUIRED HYPOTHYROIDISM: ICD-10-CM

## 2025-03-27 RX ORDER — AMLODIPINE BESYLATE 5 MG/1
5 TABLET ORAL DAILY
Qty: 90 TABLET | Refills: 1 | Status: SHIPPED | OUTPATIENT
Start: 2025-03-27

## 2025-03-27 RX ORDER — CLOPIDOGREL BISULFATE 75 MG/1
75 TABLET ORAL DAILY
Qty: 90 TABLET | Refills: 1 | Status: SHIPPED | OUTPATIENT
Start: 2025-03-27

## 2025-03-27 RX ORDER — ALBUTEROL SULFATE 90 UG/1
2 INHALANT RESPIRATORY (INHALATION) EVERY 4 HOURS PRN
Qty: 18 G | Refills: 2 | Status: SHIPPED | OUTPATIENT
Start: 2025-03-27

## 2025-03-27 RX ORDER — LISINOPRIL 20 MG/1
20 TABLET ORAL DAILY
Qty: 90 TABLET | Refills: 1 | Status: SHIPPED | OUTPATIENT
Start: 2025-03-27

## 2025-03-27 RX ORDER — LEVOTHYROXINE SODIUM 75 UG/1
75 TABLET ORAL
Qty: 30 TABLET | Refills: 3 | Status: SHIPPED | OUTPATIENT
Start: 2025-03-27

## 2025-03-27 RX ORDER — GABAPENTIN 100 MG/1
100 CAPSULE ORAL NIGHTLY
Qty: 90 CAPSULE | Refills: 1 | Status: SHIPPED | OUTPATIENT
Start: 2025-03-27

## 2025-03-27 RX ORDER — ATORVASTATIN CALCIUM 20 MG/1
20 TABLET, FILM COATED ORAL NIGHTLY
Qty: 90 TABLET | Refills: 3 | Status: SHIPPED | OUTPATIENT
Start: 2025-03-27

## 2025-03-27 RX ORDER — ROPINIROLE 0.5 MG/1
TABLET, FILM COATED ORAL
Qty: 90 TABLET | Refills: 3 | Status: SHIPPED | OUTPATIENT
Start: 2025-03-27

## 2025-03-27 NOTE — TELEPHONE ENCOUNTER
----- Message from Leonila sent at 3/27/2025 10:57 AM CDT -----  Regarding: RE: Med Refill Request  Pt scheduled appt for 03/31, but states its hard for him to get around because he and wife don't drive but he will try to be here on Monday.  ----- Message -----  From: Casi Floyd LPN  Sent: 3/27/2025  10:31 AM CDT  To: Leonila Pendleton  Subject: RE: Med Refill Request                           Patient due for an appointment see if he can schedule one and then let me know and I can send refills to [facility] but he does need to be seen but also dont want him to run out lol  ----- Message -----  From: Leonila Pendleton  Sent: 3/26/2025   1:36 PM CDT  To: Anthony RIBEIRO Staff  Subject: Med Refill Request                               Pt requesting refills of the following medications:gabapentin (NEURONTIN) 100 MG capsulelevothyroxine (SYNTHROID) 75 MCG tabletamLODIPine (NORVASC) 5 MG tabletclopidogreL (PLAVIX) 75 mg tabletlisinopriL (PRINIVIL,ZESTRIL) 20 MG tabletPharmacy: [facility] phone :# [phone]

## 2025-03-31 ENCOUNTER — OFFICE VISIT (OUTPATIENT)
Dept: FAMILY MEDICINE | Facility: CLINIC | Age: OVER 89
End: 2025-03-31
Payer: MEDICARE

## 2025-03-31 VITALS
TEMPERATURE: 98 F | SYSTOLIC BLOOD PRESSURE: 150 MMHG | DIASTOLIC BLOOD PRESSURE: 66 MMHG | WEIGHT: 116 LBS | BODY MASS INDEX: 18.64 KG/M2 | HEART RATE: 65 BPM | OXYGEN SATURATION: 96 % | RESPIRATION RATE: 16 BRPM | HEIGHT: 66 IN

## 2025-03-31 DIAGNOSIS — I10 ESSENTIAL HYPERTENSION: Primary | ICD-10-CM

## 2025-03-31 DIAGNOSIS — F03.A0 MILD DEMENTIA WITHOUT BEHAVIORAL DISTURBANCE, PSYCHOTIC DISTURBANCE, MOOD DISTURBANCE, OR ANXIETY, UNSPECIFIED DEMENTIA TYPE: ICD-10-CM

## 2025-03-31 DIAGNOSIS — I69.351 HEMIPLEGIA AND HEMIPARESIS FOLLOWING CEREBRAL INFARCTION AFFECTING RIGHT DOMINANT SIDE: ICD-10-CM

## 2025-03-31 DIAGNOSIS — G25.81 RESTLESS LEGS: ICD-10-CM

## 2025-03-31 DIAGNOSIS — Z86.73 H/O: CVA (CEREBROVASCULAR ACCIDENT): ICD-10-CM

## 2025-03-31 DIAGNOSIS — E78.5 DYSLIPIDEMIA: ICD-10-CM

## 2025-03-31 DIAGNOSIS — J44.9 CHRONIC OBSTRUCTIVE PULMONARY DISEASE, UNSPECIFIED COPD TYPE: ICD-10-CM

## 2025-03-31 DIAGNOSIS — E03.9 ACQUIRED HYPOTHYROIDISM: ICD-10-CM

## 2025-03-31 LAB
ALBUMIN SERPL BCP-MCNC: 4.1 G/DL (ref 3.4–4.8)
ALBUMIN/GLOB SERPL: 1.5 {RATIO}
ALP SERPL-CCNC: 114 U/L (ref 40–150)
ALT SERPL W P-5'-P-CCNC: 25 U/L
ANION GAP SERPL CALCULATED.3IONS-SCNC: 13 MMOL/L (ref 7–16)
AST SERPL W P-5'-P-CCNC: 42 U/L (ref 11–45)
BILIRUB SERPL-MCNC: 0.4 MG/DL
BUN SERPL-MCNC: 21 MG/DL (ref 8–26)
BUN/CREAT SERPL: 17 (ref 6–20)
CALCIUM SERPL-MCNC: 9.2 MG/DL (ref 8.8–10)
CHLORIDE SERPL-SCNC: 105 MMOL/L (ref 98–111)
CO2 SERPL-SCNC: 22 MMOL/L (ref 23–31)
CREAT SERPL-MCNC: 1.25 MG/DL (ref 0.72–1.25)
EGFR (NO RACE VARIABLE) (RUSH/TITUS): 54 ML/MIN/1.73M2
GLOBULIN SER-MCNC: 2.8 G/DL (ref 2–4)
GLUCOSE SERPL-MCNC: 98 MG/DL (ref 75–121)
POTASSIUM SERPL-SCNC: 4.4 MMOL/L (ref 3.5–5.1)
PROT SERPL-MCNC: 6.9 G/DL (ref 5.8–7.6)
SODIUM SERPL-SCNC: 136 MMOL/L (ref 136–145)
T4 FREE SERPL-MCNC: 1.61 NG/DL (ref 0.7–1.48)
TSH SERPL DL<=0.005 MIU/L-ACNC: 0.11 UIU/ML (ref 0.35–4.94)

## 2025-03-31 PROCEDURE — 1160F RVW MEDS BY RX/DR IN RCRD: CPT | Mod: ,,, | Performed by: NURSE PRACTITIONER

## 2025-03-31 PROCEDURE — 84443 ASSAY THYROID STIM HORMONE: CPT | Mod: ,,, | Performed by: CLINICAL MEDICAL LABORATORY

## 2025-03-31 PROCEDURE — 99214 OFFICE O/P EST MOD 30 MIN: CPT | Mod: ,,, | Performed by: NURSE PRACTITIONER

## 2025-03-31 PROCEDURE — 1126F AMNT PAIN NOTED NONE PRSNT: CPT | Mod: ,,, | Performed by: NURSE PRACTITIONER

## 2025-03-31 PROCEDURE — 84439 ASSAY OF FREE THYROXINE: CPT | Mod: ,,, | Performed by: CLINICAL MEDICAL LABORATORY

## 2025-03-31 PROCEDURE — 80053 COMPREHEN METABOLIC PANEL: CPT | Mod: ,,, | Performed by: CLINICAL MEDICAL LABORATORY

## 2025-03-31 PROCEDURE — 1159F MED LIST DOCD IN RCRD: CPT | Mod: ,,, | Performed by: NURSE PRACTITIONER

## 2025-03-31 RX ORDER — LEVOTHYROXINE SODIUM 88 UG/1
88 TABLET ORAL
Qty: 90 TABLET | Refills: 1 | Status: CANCELLED | OUTPATIENT
Start: 2025-03-31

## 2025-03-31 NOTE — PROGRESS NOTES
[facility]    Chief Complaint      Chief Complaint   Patient presents with    Medication Refill    Hypertension       History of Present Illness      Gregory Plasencia is a 89 y.o. male. He  has a past medical history of Hypertension, Hypothyroidism, unspecified, and Stroke., who presents today for f/u of his HTN and Hypothyroid with medication refills. He is accompanied by his wife today.     Past Medical History:  Past Medical History:   Diagnosis Date    Hypertension     Hypothyroidism, unspecified     Stroke        Past Surgical History:   has a past surgical history that includes Back surgery.    Social History:  Social History[1]    I personally reviewed all past medical, surgical, and social.     Review of Systems   Constitutional:  Negative for fatigue and unexpected weight change.   HENT:  Negative for sore throat.    Eyes:  Negative for discharge, redness and visual disturbance.   Respiratory:  Negative for shortness of breath.    Cardiovascular: Negative.    Gastrointestinal:  Negative for abdominal pain, constipation, diarrhea, nausea and vomiting.   Genitourinary:  Negative for difficulty urinating and dysuria.   Musculoskeletal:  Positive for gait problem.        Ambulates with cane   Skin: Negative.    Neurological:  Positive for weakness. Negative for dizziness, light-headedness and headaches.        Medications:  Encounter Medications[2]    Allergies:  Review of patient's allergies indicates:   Allergen Reactions    Gadolinium-containing contrast media        Health Maintenance:  Immunization History   Administered Date(s) Administered    COVID-19, MRNA, LN-S, PF (MODERNA FULL 0.5 ML DOSE) 02/11/2021, 03/11/2021, 11/04/2021    COVID-19, mRNA, LNP-S, bivalent booster, PF (Moderna Omicron)12 + YEARS 10/31/2022    Tdap 08/21/2023      Health Maintenance   Topic Date Due    Shingles Vaccine (1 of 2) Never done    RSV Vaccine (Age 60+ and Pregnant patients) (1 - 1-dose 75+ series) Never done     "COVID-19 Vaccine (5 - 2024-25 season) 09/01/2024    Lipid Panel  08/14/2029    TETANUS VACCINE  08/21/2033    Influenza Vaccine  Completed    Pneumococcal Vaccines (Age 50+)  Completed        Physical Exam      Vital Signs  Temp: 97.6 °F (36.4 °C)  Temp Source: Oral  Pulse: 65  Resp: 16  SpO2: 96 %  BP: (!) 150/66  Pain Score: 0-No pain  Height and Weight  Height: 5' 6" (167.6 cm)  Weight: 52.6 kg (116 lb)  BSA (Calculated - sq m): 1.57 sq meters  BMI (Calculated): 18.7  Weight in (lb) to have BMI = 25: 154.6]    Physical Exam  Vitals and nursing note reviewed.   Constitutional:       Appearance: Normal appearance.   HENT:      Head: Normocephalic.      Right Ear: Hearing normal.      Left Ear: Hearing normal.      Nose: Nose normal.   Eyes:      General: Lids are normal.      Conjunctiva/sclera: Conjunctivae normal.   Neck:      Thyroid: No thyromegaly.   Cardiovascular:      Rate and Rhythm: Normal rate and regular rhythm.      Heart sounds: Normal heart sounds.   Pulmonary:      Effort: Pulmonary effort is normal.      Breath sounds: Normal breath sounds.   Musculoskeletal:         General: Normal range of motion.      Cervical back: Normal range of motion and neck supple.      Right lower leg: No edema.      Left lower leg: No edema.   Skin:     General: Skin is warm and dry.   Neurological:      General: No focal deficit present.      Mental Status: He is alert and oriented to person, place, and time.      Gait: Gait abnormal.      Comments: Ambulates with cane   Psychiatric:         Cognition and Memory: Memory is impaired.          Laboratory:  CBC:  Recent Labs   Lab 03/14/23  1600 08/14/24  1138 09/04/24  1431   WBC 8.46 5.72 8.51   RBC 4.70 3.83 L 3.99 L   Hemoglobin 12.9 L 10.5 L 11.0 L   Hematocrit 41.2 34.4 L 35.2 L   Platelet Count 319 226 241   MCV 87.7 89.8 88.2   MCH 27.4 27.4 27.6   MCHC 31.3 L 30.5 L 31.3 L     CMP:  Recent Labs   Lab 11/14/23  1048 02/14/24  1014 08/14/24  1138 03/31/25  1119 "   Glucose 96   < > 114 H 98   Calcium 9.2   < > 8.9 9.2   Albumin 3.4 L  --  3.2 L 4.1   Total Protein 7.4  --  6.5 6.9   Sodium 141   < > 142 136   Potassium 4.1   < > 4.2 4.4   CO2 27   < > 27 22 L   Chloride 107   < > 110 H 105   BUN 20 H   < > 30 H 21   Alk Phos 115  --  102 114   ALT 30  --  28 25   AST 30  --  29 42   Bilirubin, Total 0.3  --  0.5 0.4    < > = values in this interval not displayed.     LIPIDS:  Recent Labs   Lab 11/10/22  0857 02/08/23  0853 03/14/23  1600 11/14/23  1048 08/14/24  1138 03/31/25  1119   TSH 0.048 L 0.018 L   < > 3.690 0.021 L 0.106 L   HDL Cholesterol 54 47  --   --  62 H  --    Cholesterol 112 106  --   --  115  --    Triglycerides 71 90  --   --  75  --    LDL Calculated 44 41  --   --  38  --    Cholesterol/HDL Ratio (Risk Factor) 2.1 2.3  --   --  1.9  --    Non-HDL 58 59  --   --  53  --     < > = values in this interval not displayed.     TSH:  Recent Labs   Lab 11/14/23  1048 08/14/24  1138 03/31/25  1119   TSH 3.690 0.021 L 0.106 L     A1C:        Assessment/Plan     Gregory Plasencia is a 89 y.o.male with:     1. Essential hypertension  -     Comprehensive Metabolic Panel; Future; Expected date: 03/31/2025  -     amLODIPine (NORVASC) 5 MG tablet; Take 1 tablet (5 mg total) by mouth once daily.  Dispense: 90 tablet; Refill: 1  -     lisinopriL (PRINIVIL,ZESTRIL) 20 MG tablet; Take 1 tablet (20 mg total) by mouth once daily.  Dispense: 90 tablet; Refill: 1    2. Chronic obstructive pulmonary disease, unspecified COPD type  -     albuterol (PROVENTIL/VENTOLIN HFA) 90 mcg/actuation inhaler; Inhale 2 puffs into the lungs every 4 (four) hours as needed for Shortness of Breath.  Dispense: 18 g; Refill: 2    3. Dyslipidemia  -     atorvastatin (LIPITOR) 20 MG tablet; Take 1 tablet (20 mg total) by mouth every evening.  Dispense: 90 tablet; Refill: 3    4. H/O: CVA (cerebrovascular accident)  -     Comprehensive Metabolic Panel; Future; Expected date: 03/31/2025  -      clopidogreL (PLAVIX) 75 mg tablet; Take 1 tablet (75 mg total) by mouth once daily.  Dispense: 90 tablet; Refill: 1    5. Restless legs  -     Comprehensive Metabolic Panel; Future; Expected date: 03/31/2025  -     gabapentin (NEURONTIN) 100 MG capsule; Take 1 capsule (100 mg total) by mouth every evening.  Dispense: 90 capsule; Refill: 1  -     rOPINIRole (REQUIP) 0.5 MG tablet; TAKE 1 TABLET AT BEDTIME FOR RESTLESS LEGS  Dispense: 90 tablet; Refill: 3    6. Acquired hypothyroidism  -     Thyroid Panel; Future; Expected date: 03/31/2025    7. Mild dementia without behavioral disturbance, psychotic disturbance, mood disturbance, or anxiety, unspecified dementia type    8. Hemiplegia and hemiparesis following cerebral infarction affecting right dominant side         Total time spent face-to-face and non-face-to-face coordinating care for this encounter was: 30 minutes     Chronic conditions status updated as per HPI.  Other than changes above, cont current medications and maintain follow up with specialists.  Return to clinic in 6 month(s).    Aggie Cruz, [facility]         [1]   Social History  Tobacco Use    Smoking status: Never     Passive exposure: Never    Smokeless tobacco: Former     Types: Chew   Substance Use Topics    Alcohol use: Never    Drug use: Never   [2]   Outpatient Encounter Medications as of 3/31/2025   Medication Sig Dispense Refill    [DISCONTINUED] levothyroxine (SYNTHROID) 75 MCG tablet Take 1 tablet (75 mcg total) by mouth before breakfast. (Patient taking differently: Take 88 mcg by mouth before breakfast.) 30 tablet 3    albuterol (PROVENTIL/VENTOLIN HFA) 90 mcg/actuation inhaler Inhale 2 puffs into the lungs every 4 (four) hours as needed for Shortness of Breath. 18 g 2    amLODIPine (NORVASC) 5 MG tablet Take 1 tablet (5 mg total) by mouth once daily. 90 tablet 1    atorvastatin (LIPITOR) 20 MG tablet Take 1 tablet (20 mg total) by mouth every evening. 90 tablet 3     clopidogreL (PLAVIX) 75 mg tablet Take 1 tablet (75 mg total) by mouth once daily. 90 tablet 1    gabapentin (NEURONTIN) 100 MG capsule Take 1 capsule (100 mg total) by mouth every evening. 90 capsule 1    lisinopriL (PRINIVIL,ZESTRIL) 20 MG tablet Take 1 tablet (20 mg total) by mouth once daily. 90 tablet 1    rOPINIRole (REQUIP) 0.5 MG tablet TAKE 1 TABLET AT BEDTIME FOR RESTLESS LEGS 90 tablet 3    [DISCONTINUED] albuterol (PROVENTIL/VENTOLIN HFA) 90 mcg/actuation inhaler Inhale 2 puffs into the lungs every 4 (four) hours as needed for Shortness of Breath. 18 g 2    [DISCONTINUED] amLODIPine (NORVASC) 5 MG tablet Take 1 tablet (5 mg total) by mouth once daily. 90 tablet 1    [DISCONTINUED] atorvastatin (LIPITOR) 20 MG tablet Take 1 tablet (20 mg total) by mouth every evening. 90 tablet 3    [DISCONTINUED] clopidogreL (PLAVIX) 75 mg tablet Take 1 tablet (75 mg total) by mouth once daily. 90 tablet 1    [DISCONTINUED] gabapentin (NEURONTIN) 100 MG capsule Take 1 capsule (100 mg total) by mouth every evening. 90 capsule 1    [DISCONTINUED] lisinopriL (PRINIVIL,ZESTRIL) 20 MG tablet Take 1 tablet (20 mg total) by mouth once daily. 90 tablet 1    [DISCONTINUED] rOPINIRole (REQUIP) 0.5 MG tablet TAKE 1 TABLET AT BEDTIME FOR RESTLESS LEGS 90 tablet 3     No facility-administered encounter medications on file as of 3/31/2025.

## 2025-04-03 ENCOUNTER — RESULTS FOLLOW-UP (OUTPATIENT)
Dept: FAMILY MEDICINE | Facility: CLINIC | Age: OVER 89
End: 2025-04-03

## 2025-04-03 DIAGNOSIS — E03.9 ACQUIRED HYPOTHYROIDISM: ICD-10-CM

## 2025-04-03 PROBLEM — F03.A0 MILD DEMENTIA WITHOUT BEHAVIORAL DISTURBANCE, PSYCHOTIC DISTURBANCE, MOOD DISTURBANCE, OR ANXIETY, UNSPECIFIED DEMENTIA TYPE: Status: ACTIVE | Noted: 2025-04-03

## 2025-04-03 PROBLEM — I69.351 HEMIPLEGIA AND HEMIPARESIS FOLLOWING CEREBRAL INFARCTION AFFECTING RIGHT DOMINANT SIDE: Status: ACTIVE | Noted: 2025-04-03

## 2025-04-03 RX ORDER — ALBUTEROL SULFATE 90 UG/1
2 INHALANT RESPIRATORY (INHALATION) EVERY 4 HOURS PRN
Qty: 18 G | Refills: 2 | Status: SHIPPED | OUTPATIENT
Start: 2025-04-03

## 2025-04-03 RX ORDER — LISINOPRIL 20 MG/1
20 TABLET ORAL DAILY
Qty: 90 TABLET | Refills: 1 | Status: SHIPPED | OUTPATIENT
Start: 2025-04-03

## 2025-04-03 RX ORDER — GABAPENTIN 100 MG/1
100 CAPSULE ORAL NIGHTLY
Qty: 90 CAPSULE | Refills: 1 | Status: SHIPPED | OUTPATIENT
Start: 2025-04-03

## 2025-04-03 RX ORDER — ROPINIROLE 0.5 MG/1
TABLET, FILM COATED ORAL
Qty: 90 TABLET | Refills: 3 | Status: SHIPPED | OUTPATIENT
Start: 2025-04-03

## 2025-04-03 RX ORDER — AMLODIPINE BESYLATE 5 MG/1
5 TABLET ORAL DAILY
Qty: 90 TABLET | Refills: 1 | Status: SHIPPED | OUTPATIENT
Start: 2025-04-03

## 2025-04-03 RX ORDER — CLOPIDOGREL BISULFATE 75 MG/1
75 TABLET ORAL DAILY
Qty: 90 TABLET | Refills: 1 | Status: SHIPPED | OUTPATIENT
Start: 2025-04-03

## 2025-04-03 RX ORDER — LEVOTHYROXINE SODIUM 75 UG/1
75 TABLET ORAL
Qty: 30 TABLET | Refills: 3 | Status: SHIPPED | OUTPATIENT
Start: 2025-04-03

## 2025-04-03 RX ORDER — ATORVASTATIN CALCIUM 20 MG/1
20 TABLET, FILM COATED ORAL NIGHTLY
Qty: 90 TABLET | Refills: 3 | Status: SHIPPED | OUTPATIENT
Start: 2025-04-03

## 2025-04-04 ENCOUNTER — TELEPHONE (OUTPATIENT)
Dept: FAMILY MEDICINE | Facility: CLINIC | Age: OVER 89
End: 2025-04-04
Payer: MEDICARE

## 2025-04-04 NOTE — TELEPHONE ENCOUNTER
Patient notified that   TSH is too low- adjusting his dosage   , shan has already sent in the correct dosage.

## 2025-04-04 NOTE — TELEPHONE ENCOUNTER
----- Message from ANDREI Mendez sent at 4/3/2025 12:55 PM CDT -----  TSH is too low- adjusting his dosage  ----- Message -----  From: Lab, Background User  Sent: 3/31/2025   8:38 PM CDT  To: ANDREI Albright

## 2025-05-14 DIAGNOSIS — J44.9 CHRONIC OBSTRUCTIVE PULMONARY DISEASE, UNSPECIFIED COPD TYPE: ICD-10-CM

## 2025-05-14 RX ORDER — ALBUTEROL SULFATE 90 UG/1
INHALANT RESPIRATORY (INHALATION)
OUTPATIENT
Start: 2025-05-14

## 2025-06-23 DIAGNOSIS — E03.9 ACQUIRED HYPOTHYROIDISM: ICD-10-CM

## 2025-06-23 DIAGNOSIS — Z86.73 H/O: CVA (CEREBROVASCULAR ACCIDENT): ICD-10-CM

## 2025-06-23 DIAGNOSIS — I10 ESSENTIAL HYPERTENSION: ICD-10-CM

## 2025-06-23 RX ORDER — LEVOTHYROXINE SODIUM 75 UG/1
75 TABLET ORAL
Qty: 100 TABLET | Refills: 3 | OUTPATIENT
Start: 2025-06-23

## 2025-06-23 RX ORDER — CLOPIDOGREL BISULFATE 75 MG/1
75 TABLET ORAL
Qty: 100 TABLET | Refills: 3 | OUTPATIENT
Start: 2025-06-23

## 2025-06-23 RX ORDER — LISINOPRIL 20 MG/1
20 TABLET ORAL
Qty: 100 TABLET | Refills: 3 | OUTPATIENT
Start: 2025-06-23

## 2025-06-23 RX ORDER — AMLODIPINE BESYLATE 5 MG/1
5 TABLET ORAL
Qty: 100 TABLET | Refills: 3 | OUTPATIENT
Start: 2025-06-23

## 2025-06-30 ENCOUNTER — OFFICE VISIT (OUTPATIENT)
Dept: FAMILY MEDICINE | Facility: CLINIC | Age: OVER 89
End: 2025-06-30
Payer: MEDICARE

## 2025-06-30 VITALS
TEMPERATURE: 99 F | WEIGHT: 116 LBS | OXYGEN SATURATION: 98 % | SYSTOLIC BLOOD PRESSURE: 140 MMHG | DIASTOLIC BLOOD PRESSURE: 80 MMHG | RESPIRATION RATE: 16 BRPM | HEIGHT: 66 IN | HEART RATE: 62 BPM | BODY MASS INDEX: 18.64 KG/M2

## 2025-06-30 DIAGNOSIS — E03.9 ACQUIRED HYPOTHYROIDISM: ICD-10-CM

## 2025-06-30 DIAGNOSIS — I10 ESSENTIAL HYPERTENSION: ICD-10-CM

## 2025-06-30 DIAGNOSIS — G25.81 RESTLESS LEGS: ICD-10-CM

## 2025-06-30 DIAGNOSIS — E78.5 DYSLIPIDEMIA: ICD-10-CM

## 2025-06-30 DIAGNOSIS — Z86.73 H/O: CVA (CEREBROVASCULAR ACCIDENT): ICD-10-CM

## 2025-06-30 LAB
T4 FREE SERPL-MCNC: 1.57 NG/DL (ref 0.7–1.48)
TSH SERPL DL<=0.005 MIU/L-ACNC: 0.03 UIU/ML (ref 0.35–4.94)

## 2025-06-30 PROCEDURE — 99213 OFFICE O/P EST LOW 20 MIN: CPT | Mod: ,,, | Performed by: NURSE PRACTITIONER

## 2025-06-30 PROCEDURE — 1126F AMNT PAIN NOTED NONE PRSNT: CPT | Mod: ,,, | Performed by: NURSE PRACTITIONER

## 2025-06-30 PROCEDURE — 1101F PT FALLS ASSESS-DOCD LE1/YR: CPT | Mod: ,,, | Performed by: NURSE PRACTITIONER

## 2025-06-30 PROCEDURE — 1160F RVW MEDS BY RX/DR IN RCRD: CPT | Mod: ,,, | Performed by: NURSE PRACTITIONER

## 2025-06-30 PROCEDURE — 1159F MED LIST DOCD IN RCRD: CPT | Mod: ,,, | Performed by: NURSE PRACTITIONER

## 2025-06-30 PROCEDURE — 3288F FALL RISK ASSESSMENT DOCD: CPT | Mod: ,,, | Performed by: NURSE PRACTITIONER

## 2025-06-30 PROCEDURE — 84439 ASSAY OF FREE THYROXINE: CPT | Mod: ,,, | Performed by: CLINICAL MEDICAL LABORATORY

## 2025-06-30 PROCEDURE — 84443 ASSAY THYROID STIM HORMONE: CPT | Mod: ,,, | Performed by: CLINICAL MEDICAL LABORATORY

## 2025-06-30 RX ORDER — ATORVASTATIN CALCIUM 20 MG/1
20 TABLET, FILM COATED ORAL NIGHTLY
Qty: 90 TABLET | Refills: 1 | Status: SHIPPED | OUTPATIENT
Start: 2025-06-30

## 2025-06-30 RX ORDER — AMLODIPINE BESYLATE 5 MG/1
5 TABLET ORAL DAILY
Qty: 90 TABLET | Refills: 1 | Status: SHIPPED | OUTPATIENT
Start: 2025-06-30

## 2025-06-30 RX ORDER — LEVOTHYROXINE SODIUM 75 UG/1
75 TABLET ORAL
Qty: 30 TABLET | Refills: 3 | Status: CANCELLED | OUTPATIENT
Start: 2025-06-30

## 2025-06-30 RX ORDER — ROPINIROLE 0.5 MG/1
TABLET, FILM COATED ORAL
Qty: 90 TABLET | Refills: 1 | Status: SHIPPED | OUTPATIENT
Start: 2025-06-30

## 2025-06-30 RX ORDER — LISINOPRIL 20 MG/1
20 TABLET ORAL DAILY
Qty: 90 TABLET | Refills: 1 | Status: SHIPPED | OUTPATIENT
Start: 2025-06-30

## 2025-06-30 RX ORDER — CLOPIDOGREL BISULFATE 75 MG/1
75 TABLET ORAL DAILY
Qty: 90 TABLET | Refills: 1 | Status: SHIPPED | OUTPATIENT
Start: 2025-06-30

## 2025-06-30 RX ORDER — GABAPENTIN 100 MG/1
100 CAPSULE ORAL NIGHTLY
Qty: 90 CAPSULE | Refills: 1 | Status: SHIPPED | OUTPATIENT
Start: 2025-06-30

## 2025-06-30 NOTE — PROGRESS NOTES
"[facility] Medicine    Chief Complaint      Chief Complaint   Patient presents with    Hypertension     Medication refills. Pt  spouse reports some episodes of weakness.       History of Present Illness      Gregory Plasencia is a 89 y.o. male. He  has a past medical history of Hypertension, Hypothyroidism, unspecified, and Stroke., who presents today for f/u of his HTN and Hypothyroid with medication refills.  We will need to recheck his TSH today- last result in March was low. Wife reports episodes of "weakness"- she states this is due to him wanting to sit outside in the heat every day and she has to make him come inside about 11am.     Past Medical History:  Past Medical History:   Diagnosis Date    Hypertension     Hypothyroidism, unspecified     Stroke        Past Surgical History:   has a past surgical history that includes Back surgery.    Social History:  Social History[1]    I personally reviewed all past medical, surgical, and social.     Review of Systems   Constitutional:  Negative for fatigue and unexpected weight change.   HENT:  Negative for sore throat.    Eyes:  Negative for visual disturbance.   Respiratory:  Negative for shortness of breath.    Cardiovascular: Negative.    Gastrointestinal:  Negative for abdominal pain, constipation, diarrhea, nausea and vomiting.   Genitourinary:  Negative for difficulty urinating.   Musculoskeletal:  Positive for gait problem.   Skin: Negative.    Neurological:  Positive for weakness. Negative for dizziness, light-headedness and headaches.        Medications:  Encounter Medications[2]    Allergies:  Review of patient's allergies indicates:   Allergen Reactions    Gadolinium-containing contrast media        Health Maintenance:  Immunization History   Administered Date(s) Administered    COVID-19 MRNA, LN-S PF (MODERNA HALF 0.25 ML DOSE) 11/04/2021    COVID-19, MRNA, LN-S, PF (MODERNA FULL 0.5 ML DOSE) 02/11/2021, 03/11/2021    COVID-19, mRNA, LNP-S, bivalent " "booster, PF (Moderna Omicron)12 + YEARS 10/31/2022    Influenza (FLUAD) - Quadrivalent - Adjuvanted - PF *Preferred* (65+) 10/06/2020, 11/09/2021, 10/27/2022    Influenza - Quadrivalent - High Dose - PF (65 years and older) 09/26/2023    Influenza - Trivalent - Afluria, Fluzone MDV 10/29/2014, 11/18/2016    Influenza - Trivalent - Fluzone High Dose - PF (65 years and older) 11/05/2010, 10/17/2011, 10/03/2024    Pneumococcal Conjugate - 20 Valent 10/03/2024    Tdap 08/21/2023      Health Maintenance   Topic Date Due    Shingles Vaccine (1 of 2) Never done    RSV Vaccine (Age 60+ and Pregnant patients) (1 - 1-dose 75+ series) Never done    COVID-19 Vaccine (5 - 2024-25 season) 09/01/2024    Lipid Panel  08/14/2029    TETANUS VACCINE  08/21/2033    Influenza Vaccine  Completed    Pneumococcal Vaccines (Age 50+)  Completed        Physical Exam      Vital Signs  Temp: 98.6 °F (37 °C)  Temp Source: Oral  Pulse: 62  Resp: 16  SpO2: 98 %  BP: (!) 140/80  BP Location: Left arm  Patient Position: Sitting  Pain Score: 0-No pain  Height and Weight  Height: 5' 6" (167.6 cm)  Weight: 52.6 kg (116 lb)  BSA (Calculated - sq m): 1.57 sq meters  BMI (Calculated): 18.7  Weight in (lb) to have BMI = 25: 154.6]    Physical Exam  Vitals and nursing note reviewed.   Constitutional:       Appearance: Normal appearance.   HENT:      Head: Normocephalic.      Right Ear: Hearing normal.      Left Ear: Hearing normal.      Nose: Nose normal.   Eyes:      General: Lids are normal.      Extraocular Movements: Extraocular movements intact.      Conjunctiva/sclera: Conjunctivae normal.      Pupils: Pupils are equal, round, and reactive to light.   Neck:      Thyroid: No thyromegaly.   Cardiovascular:      Rate and Rhythm: Normal rate and regular rhythm.      Heart sounds: Normal heart sounds.   Pulmonary:      Effort: Pulmonary effort is normal.      Breath sounds: Normal breath sounds.   Musculoskeletal:         General: Normal range of motion. "      Cervical back: Normal range of motion and neck supple.      Right lower leg: No edema.      Left lower leg: No edema.   Skin:     General: Skin is warm and dry.      Findings: No rash.   Neurological:      General: No focal deficit present.      Mental Status: He is alert and oriented to person, place, and time.      Gait: Gait abnormal.      Comments: Ambulates with cane          Laboratory:  CBC:  Recent Labs   Lab 03/14/23  1600 08/14/24  1138 09/04/24  1431   WBC 8.46 5.72 8.51   RBC 4.70 3.83 L 3.99 L   Hemoglobin 12.9 L 10.5 L 11.0 L   Hematocrit 41.2 34.4 L 35.2 L   Platelet Count 319 226 241   MCV 87.7 89.8 88.2   MCH 27.4 27.4 27.6   MCHC 31.3 L 30.5 L 31.3 L     CMP:  Recent Labs   Lab 11/14/23  1048 02/14/24  1014 08/14/24  1138 03/31/25  1119   Glucose 96   < > 114 H 98   Calcium 9.2   < > 8.9 9.2   Albumin 3.4 L  --  3.2 L 4.1   Total Protein 7.4  --  6.5 6.9   Sodium 141   < > 142 136   Potassium 4.1   < > 4.2 4.4   CO2 27   < > 27 22 L   Chloride 107   < > 110 H 105   BUN 20 H   < > 30 H 21   Alk Phos 115  --  102 114   ALT 30  --  28 25   AST 30  --  29 42   Bilirubin, Total 0.3  --  0.5 0.4    < > = values in this interval not displayed.     LIPIDS:  Recent Labs   Lab 11/10/22  0857 02/08/23  0853 03/14/23  1600 11/14/23  1048 08/14/24  1138 03/31/25  1119   TSH 0.048 L 0.018 L   < > 3.690 0.021 L 0.106 L   HDL Cholesterol 54 47  --   --  62 H  --    Cholesterol 112 106  --   --  115  --    Triglycerides 71 90  --   --  75  --    LDL Calculated 44 41  --   --  38  --    Cholesterol/HDL Ratio (Risk Factor) 2.1 2.3  --   --  1.9  --    Non-HDL 58 59  --   --  53  --     < > = values in this interval not displayed.     TSH:  Recent Labs   Lab 11/14/23  1048 08/14/24  1138 03/31/25  1119   TSH 3.690 0.021 L 0.106 L     A1C:        Assessment/Plan     Gregory Plasencia is a 89 y.o.male with:     1. Essential hypertension  -     amLODIPine (NORVASC) 5 MG tablet; Take 1 tablet (5 mg total) by mouth once  daily.  Dispense: 90 tablet; Refill: 1  -     lisinopriL (PRINIVIL,ZESTRIL) 20 MG tablet; Take 1 tablet (20 mg total) by mouth once daily.  Dispense: 90 tablet; Refill: 1    2. Dyslipidemia  -     atorvastatin (LIPITOR) 20 MG tablet; Take 1 tablet (20 mg total) by mouth every evening.  Dispense: 90 tablet; Refill: 1    3. H/O: CVA (cerebrovascular accident)  -     clopidogreL (PLAVIX) 75 mg tablet; Take 1 tablet (75 mg total) by mouth once daily.  Dispense: 90 tablet; Refill: 1    4. Restless legs  -     gabapentin (NEURONTIN) 100 MG capsule; Take 1 capsule (100 mg total) by mouth every evening.  Dispense: 90 capsule; Refill: 1  -     rOPINIRole (REQUIP) 0.5 MG tablet; TAKE 1 TABLET AT BEDTIME FOR RESTLESS LEGS  Dispense: 90 tablet; Refill: 1    5. Acquired hypothyroidism  -     Thyroid Panel; Future; Expected date: 06/30/2025       Will send in Synthroid once TSH level is back      Total time spent face-to-face and non-face-to-face coordinating care for this encounter was: 20 minutes     Chronic conditions status updated as per HPI.  Other than changes above, cont current medications and maintain follow up with specialists.  Return to clinic in 6 month(s).    Aggie Cruz, P  [facility]         [1]   Social History  Tobacco Use    Smoking status: Never     Passive exposure: Never    Smokeless tobacco: Former     Types: Chew   Substance Use Topics    Alcohol use: Never    Drug use: Never   [2]   Outpatient Encounter Medications as of 6/30/2025   Medication Sig Dispense Refill    albuterol (PROVENTIL/VENTOLIN HFA) 90 mcg/actuation inhaler Inhale 2 puffs into the lungs every 4 (four) hours as needed for Shortness of Breath. 18 g 2    levothyroxine (SYNTHROID) 75 MCG tablet Take 1 tablet (75 mcg total) by mouth before breakfast. 30 tablet 3    [DISCONTINUED] amLODIPine (NORVASC) 5 MG tablet Take 1 tablet (5 mg total) by mouth once daily. 90 tablet 1    [DISCONTINUED] atorvastatin (LIPITOR) 20 MG tablet Take  1 tablet (20 mg total) by mouth every evening. 90 tablet 3    [DISCONTINUED] clopidogreL (PLAVIX) 75 mg tablet Take 1 tablet (75 mg total) by mouth once daily. 90 tablet 1    [DISCONTINUED] gabapentin (NEURONTIN) 100 MG capsule Take 1 capsule (100 mg total) by mouth every evening. 90 capsule 1    [DISCONTINUED] lisinopriL (PRINIVIL,ZESTRIL) 20 MG tablet Take 1 tablet (20 mg total) by mouth once daily. 90 tablet 1    [DISCONTINUED] rOPINIRole (REQUIP) 0.5 MG tablet TAKE 1 TABLET AT BEDTIME FOR RESTLESS LEGS 90 tablet 3    amLODIPine (NORVASC) 5 MG tablet Take 1 tablet (5 mg total) by mouth once daily. 90 tablet 1    atorvastatin (LIPITOR) 20 MG tablet Take 1 tablet (20 mg total) by mouth every evening. 90 tablet 1    clopidogreL (PLAVIX) 75 mg tablet Take 1 tablet (75 mg total) by mouth once daily. 90 tablet 1    gabapentin (NEURONTIN) 100 MG capsule Take 1 capsule (100 mg total) by mouth every evening. 90 capsule 1    lisinopriL (PRINIVIL,ZESTRIL) 20 MG tablet Take 1 tablet (20 mg total) by mouth once daily. 90 tablet 1    rOPINIRole (REQUIP) 0.5 MG tablet TAKE 1 TABLET AT BEDTIME FOR RESTLESS LEGS 90 tablet 1     No facility-administered encounter medications on file as of 6/30/2025.